# Patient Record
Sex: MALE | Race: WHITE | NOT HISPANIC OR LATINO | Employment: UNEMPLOYED | URBAN - METROPOLITAN AREA
[De-identification: names, ages, dates, MRNs, and addresses within clinical notes are randomized per-mention and may not be internally consistent; named-entity substitution may affect disease eponyms.]

---

## 2017-02-08 DIAGNOSIS — M25.511 ACUTE PAIN OF RIGHT SHOULDER: Primary | ICD-10-CM

## 2017-02-09 ENCOUNTER — OFFICE VISIT (OUTPATIENT)
Dept: ORTHOPEDICS | Facility: CLINIC | Age: 47
End: 2017-02-09
Payer: COMMERCIAL

## 2017-02-09 ENCOUNTER — HOSPITAL ENCOUNTER (OUTPATIENT)
Dept: RADIOLOGY | Facility: HOSPITAL | Age: 47
Discharge: HOME | End: 2017-02-09
Attending: ORTHOPAEDIC SURGERY
Payer: COMMERCIAL

## 2017-02-09 VITALS
HEIGHT: 71 IN | HEART RATE: 74 BPM | WEIGHT: 152.13 LBS | SYSTOLIC BLOOD PRESSURE: 127 MMHG | DIASTOLIC BLOOD PRESSURE: 88 MMHG | BODY MASS INDEX: 21.3 KG/M2

## 2017-02-09 DIAGNOSIS — M25.511 ACUTE PAIN OF RIGHT SHOULDER: ICD-10-CM

## 2017-02-09 DIAGNOSIS — G89.29 CHRONIC RIGHT SHOULDER PAIN: Primary | ICD-10-CM

## 2017-02-09 DIAGNOSIS — M75.111 INCOMPLETE TEAR OF RIGHT ROTATOR CUFF: ICD-10-CM

## 2017-02-09 DIAGNOSIS — M75.110 INCOMPLETE TEAR OF ROTATOR CUFF, UNSPECIFIED LATERALITY: Primary | ICD-10-CM

## 2017-02-09 DIAGNOSIS — M25.511 CHRONIC RIGHT SHOULDER PAIN: Primary | ICD-10-CM

## 2017-02-09 PROCEDURE — 73030 X-RAY EXAM OF SHOULDER: CPT | Mod: 26,RT,, | Performed by: RADIOLOGY

## 2017-02-09 PROCEDURE — 73030 X-RAY EXAM OF SHOULDER: CPT | Mod: TC,PO,RT

## 2017-02-09 PROCEDURE — 99213 OFFICE O/P EST LOW 20 MIN: CPT | Mod: PBBFAC,PO | Performed by: ORTHOPAEDIC SURGERY

## 2017-02-09 PROCEDURE — 99999 PR PBB SHADOW E&M-EST. PATIENT-LVL III: CPT | Mod: PBBFAC,,, | Performed by: ORTHOPAEDIC SURGERY

## 2017-02-09 PROCEDURE — 99204 OFFICE O/P NEW MOD 45 MIN: CPT | Mod: S$PBB,,, | Performed by: ORTHOPAEDIC SURGERY

## 2017-02-09 NOTE — PROGRESS NOTES
"CC:This is a 47-year-old male that complains of right shoulder pain.    HPI:The patient states that he fell at Thanksgiving and sustained an injury to the right shoulder. The pain is rated a 3 out of 10.    PMH:  History reviewed. No pertinent past medical history.    PSH:  History reviewed. No pertinent past surgical history.    Family Hx:  History reviewed. No pertinent family history.    Allergy:  Review of patient's allergies indicates:  No Known Allergies    Medication:  No current outpatient prescriptions on file.    Social History:    Social History     Social History    Marital status: Single     Spouse name: N/A    Number of children: N/A    Years of education: N/A     Occupational History    Not on file.     Social History Main Topics    Smoking status: Current Every Day Smoker     Packs/day: 0.25    Smokeless tobacco: Former User     Types: Chew    Alcohol use Not on file    Drug use: Not on file    Sexual activity: Not on file     Other Topics Concern    Not on file     Social History Narrative    No narrative on file       Vitals:     Visit Vitals    /88    Pulse 74    Ht 5' 11" (1.803 m)    Wt 69 kg (152 lb 1.9 oz)    BMI 21.22 kg/m2        ROS:  GENERAL: No fever, chills, fatigability or weight loss.  SKIN: No rashes, itching or changes in color or texture of skin.  HEAD: No headaches or recent head trauma.  EYES: Visual acuity fine. No photophobia, ocular pain or diplopia.  EARS: Denies ear pain, discharge or vertigo.  NOSE: No loss of smell, no epistaxis or postnasal drip.  MOUTH & THROAT: No hoarseness or change in voice. No excessive gum bleeding.  NODES: Denies swollen glands.  CHEST: Denies MOLINA, cyanosis, wheezing, cough and sputum production.  CARDIOVASCULAR: Denies chest pain, PND, orthopnea or reduced exercise tolerance.  ABDOMEN: Appetite fine. No weight loss. Denies diarrhea, abdominal pain, hematemesis or blood in stool.  URINARY: No flank pain, dysuria or " hematuria.  PERIPHERAL VASCULAR: No claudication or cyanosis.  NEUROLOGIC: No history of seizures, paralysis, alteration of gait or coordination.  MUSCULOSKELETAL: See HPI    PE:  APPEARANCE: Well nourished, well developed, in no acute distress.   HEAD: Normocephalic, atraumatic.  EYES: PERRL. EOMI.   EARS: TM's intact. Light reflex normal. No retraction or perforation.   NOSE: Mucosa pink. Airway clear.  MOUTH & THROAT: No tonsillar enlargement. No pharyngeal erythema or exudate. No stridor.  NECK: Supple.   NODES: No cervical, axillary or inguinal lymph node enlargement.  CHEST: Lungs clear to auscultation.  CARDIOVASCULAR: Normal S1, S2. No rubs, murmurs or gallops.  ABDOMEN: Bowel sounds normal. Not distended. Soft. No tenderness or masses.  NEUROLOGIC: Cranial Nerves: II-XII grossly intact, also see MUSCULOSKELETAL  MUSCULOSKELETAL:     right Shoulder Exam     Muscle Appearance:Normal  Grooming:Normal  Spine Alignment-Normal  Muscle Atrophy-Yes  Deformities-Yes  Tenderness-Yes  Paresthesias-no  Range of Motion-decreased         Abd Pure-decreased         Abd Combined-decreased         Ext-decreased         Flex-decreased         Internal Rot-decreased         External Rot-decreased  Muscle Strength-decreased  Sensation-decreased  Reflexes-Normal  Crepitus-Yes  Impingement-Yes  Apprehension-No  Fatigue-Yes  Scapular Winging-No  Muscle Tightness-Yes  Instability-No  Tests on Exam-no evidence of instability  Neurovascular Status-Normal  Skin-Normal  Positive Drop Arm testYes    Assessment:  The patient has difficulty initiating abduction.  Clinically the patient has a rotator cuff tear that is significant.  As I recommend an arthroscope of the right shoulder.             Diagnosis:              1.right rotator cuff tear               2. Right greater tuberosity fracture    Diagnostic Studies  MRI-Yes  X-Ray-No  EMG/NCV-No  Arthrogram-No  Bone Scan-No  CT Scan-No  Doppler-No  ESR-No  CRP-No  CBC with Diff-No    Rheumatoid/Arthritis Panel-No      Plan:                                                 1. PT-no                                                 2.OT-yes                                          3.NSAID-yes                                        4. Narcotics-yes                                     5. Wound care-N/A                                 6. Rest-yes                                           7. Surgery-yes                                         8. ERICH Hose-no                                    9. Anticoagulation therapy-no               10. Elevation-no                                     11. Crutches-no                                    12. Walker-no             13. Cane no                        14. Referral-no                                     15.Injection-no                            16. Splint   /    Cast   /   Cast Shoe-No              17. RICE-none            18. Follow up- one week

## 2017-02-09 NOTE — LETTER
February 10, 2017      Dandre Christensen MD  [address] [facility] [address] [address]           [facility]  [address]  [place] [address]  Phone: [phone]  Fax: [phone]          Patient: García Delaney   MR Number: [id number]   YOB: 1970   Date of Visit: 2/9/2017       Dear Dr. Dandre Christensen:    Thank you for referring García Delaney to me for evaluation. Attached you will find relevant portions of my assessment and plan of care.    If you have questions, please do not hesitate to call me. I look forward to following García Delaney along with you.    Sincerely,    Jacinto Graves Sr., MD    Enclosure  CC:  No Recipients    If you would like to receive this communication electronically, please contact [email or web address] or [phone] to request more information on [employer] Link access.    For providers and/or their staff who would like to refer a patient to [facility], please contact us through our one-stop-shop provider referral line, [facility], at [phone].    If you feel you have received this communication in error or would no longer like to receive these types of communications, please e-mail [email or web address]

## 2017-02-10 PROBLEM — M75.111 INCOMPLETE TEAR OF RIGHT ROTATOR CUFF: Status: ACTIVE | Noted: 2017-02-10

## 2017-02-10 PROBLEM — M75.110 INCOMPLETE TEAR OF ROTATOR CUFF: Status: ACTIVE | Noted: 2017-02-10

## 2017-02-10 NOTE — PATIENT INSTRUCTIONS
Rotator Cuff Tear  The rotator cuff is a group of muscles and tendons that surround the shoulder joint. These muscles and tendons hold the arm in its joint. They also help the shoulder to rotate. The rotator cuff can be torn from overuse or injury. Gradual wear and tear can lead to inflammation of these tendons. This can progress to gradual or sudden tears.  Symptoms of a torn rotator cuff include:  · Shoulder pain that gets worse when you raise your arm overhead  · Weakness of the shoulder muscles with overhead activity  · Popping and clicking when you move your shoulder  · Shoulder pain that wakes you up at night when sleeping on the hurt shoulder  Diagnosis is made by an MRI or arthroscopy. This is a surgical procedure to look inside the joint through a small tube. Partial rotator cuff tears can be treated by first resting, then strengthening the rotator cuff muscles.  Anti-inflammatory medicines, such as ibuprofen or naproxen, are useful. A limited number of steroid injections can be given. Surgery may be recommended for complete tears and partial tears that do not respond to medical treatment.  Home care  · Avoid activities that make your pain worse. This includes overhead activities, doing the same motion over and over, and heavy lifting.  · You may use over-the-counter pain medicines to control pain, unless another medicine was prescribed. If you have chronic liver or kidney disease or ever had a stomach ulcer or GI bleeding, talk with your healthcare provider before using these medicines.  · If you were given a sling, use it for comfort. After your pain decreases, dont keep your arm in the sling all the time. Take your arm out several times a day and move the shoulder joint, as you are able.  · Your healthcare provider may recommend gentle pendulum exercises. Stand or sit with your arm vertical and close to your side. Relax your shoulder muscles and gently swing the arm forward and back, side to side, and  in small circles for about 5 minutes. Do this once or twice a day. There should be only slight pain with this exercise.  · You may benefit from physical therapy or a home exercise program to strengthen your shoulder muscles. This will also increase your pain-free range of motion. Applying heat prior to exercises can help prepare the muscles and joint for activity. Talk to your healthcare provider about what is best for your condition.  Follow-up care  Follow up with your healthcare provider, or as advised.  When to seek medical advice  Call your healthcare provider right away if any of the following occur:  · Increasing shoulder pain  · Rapid swelling in the involved shoulder or arm  · Numbness, tingling, or pain radiating down the arm to the hand  · Loss of strength in the affected arm  Date Last Reviewed: 11/23/2015  © 2034-8174 [employer]. [address] [place] [address], [place] [address]. All rights reserved. This information is not intended as a substitute for professional medical care. Always follow your healthcare professional's instructions.

## 2017-02-13 DIAGNOSIS — Z01.818 PRE-OP TESTING: Primary | ICD-10-CM

## 2017-02-13 DIAGNOSIS — M75.101 TEAR OF RIGHT ROTATOR CUFF, UNSPECIFIED TEAR EXTENT: ICD-10-CM

## 2017-02-24 ENCOUNTER — HOSPITAL ENCOUNTER (OUTPATIENT)
Dept: RADIOLOGY | Facility: HOSPITAL | Age: 47
Discharge: HOME | End: 2017-02-24
Attending: ORTHOPAEDIC SURGERY
Payer: COMMERCIAL

## 2017-02-24 DIAGNOSIS — G89.29 CHRONIC RIGHT SHOULDER PAIN: ICD-10-CM

## 2017-02-24 DIAGNOSIS — M25.511 CHRONIC RIGHT SHOULDER PAIN: ICD-10-CM

## 2017-02-24 PROCEDURE — 73221 MRI JOINT UPR EXTREM W/O DYE: CPT | Mod: 26,RT,, | Performed by: RADIOLOGY

## 2017-02-24 PROCEDURE — 73221 MRI JOINT UPR EXTREM W/O DYE: CPT | Mod: TC,PO,RT

## 2017-04-10 ENCOUNTER — TELEPHONE (OUTPATIENT)
Dept: ORTHOPEDICS | Facility: CLINIC | Age: 47
End: 2017-04-10

## 2017-04-10 NOTE — TELEPHONE ENCOUNTER
Returned pt phone call. Pt states he scheduled for surgery but wants to know the results of his MRI. informed pt i would send him a message for Dr. Graves or Jos Caro PA-C to call and discuss that with him. Pt also asked if he could get a print out of [facility] MRI. Informed pt he could come pick it up today. Pt verified understanding.

## 2017-04-10 NOTE — TELEPHONE ENCOUNTER
----- Message from Tito Chase sent at 4/10/2017 11:38 AM CDT -----  Contact: pt  Pt is calling nurse staff regarding results of patient MRI  Since February. Pt call back to be advised [phone] thanks

## 2017-04-13 ENCOUNTER — TELEPHONE (OUTPATIENT)
Dept: ORTHOPEDICS | Facility: CLINIC | Age: 47
End: 2017-04-13

## 2017-04-13 NOTE — TELEPHONE ENCOUNTER
----- Message from Tito Chase sent at 4/13/2017  9:13 AM CDT -----  Contact: pt  Pt is calling nurse staff regarding when is pt scheduled date for surgery is; or if, and when pt need surgery. Pt ask staff to call back at 10:00 if at all possible to be advised today  [phone] thanks

## 2017-04-19 ENCOUNTER — TELEPHONE (OUTPATIENT)
Dept: ORTHOPEDICS | Facility: CLINIC | Age: 47
End: 2017-04-19

## 2017-04-19 NOTE — TELEPHONE ENCOUNTER
Returned pt phone call. Pt wanted to know the dates and times of his pre op appointments. Pt was given dates and verified understanding.

## 2017-04-28 ENCOUNTER — HOSPITAL ENCOUNTER (OUTPATIENT)
Dept: RADIOLOGY | Facility: HOSPITAL | Age: 47
Discharge: HOME | End: 2017-04-28
Attending: ORTHOPAEDIC SURGERY
Payer: COMMERCIAL

## 2017-04-28 ENCOUNTER — CLINICAL SUPPORT (OUTPATIENT)
Dept: CARDIOLOGY | Facility: CLINIC | Age: 47
End: 2017-04-28
Payer: COMMERCIAL

## 2017-04-28 DIAGNOSIS — Z01.818 PRE-OP TESTING: ICD-10-CM

## 2017-04-28 PROCEDURE — 71020 XR CHEST PA AND LATERAL PRE-OP: CPT | Mod: 26,,, | Performed by: RADIOLOGY

## 2017-04-28 PROCEDURE — 71020 XR CHEST PA AND LATERAL PRE-OP: CPT | Mod: TC,PO

## 2017-04-28 PROCEDURE — 93005 ELECTROCARDIOGRAM TRACING: CPT | Mod: PBBFAC,PO | Performed by: INTERNAL MEDICINE

## 2017-04-28 PROCEDURE — 93010 ELECTROCARDIOGRAM REPORT: CPT | Mod: S$PBB,,, | Performed by: INTERNAL MEDICINE

## 2017-05-03 ENCOUNTER — OFFICE VISIT (OUTPATIENT)
Dept: INTERNAL MEDICINE | Facility: CLINIC | Age: 47
End: 2017-05-03
Payer: COMMERCIAL

## 2017-05-03 VITALS
SYSTOLIC BLOOD PRESSURE: 132 MMHG | BODY MASS INDEX: 21.91 KG/M2 | HEART RATE: 97 BPM | WEIGHT: 156.5 LBS | DIASTOLIC BLOOD PRESSURE: 76 MMHG | TEMPERATURE: 97 F | HEIGHT: 71 IN

## 2017-05-03 DIAGNOSIS — Z01.818 PREOP EXAMINATION: Primary | ICD-10-CM

## 2017-05-03 DIAGNOSIS — M75.111 INCOMPLETE TEAR OF RIGHT ROTATOR CUFF: ICD-10-CM

## 2017-05-03 PROCEDURE — 99999 PR PBB SHADOW E&M-EST. PATIENT-LVL III: CPT | Mod: PBBFAC,,, | Performed by: FAMILY MEDICINE

## 2017-05-03 PROCEDURE — 99213 OFFICE O/P EST LOW 20 MIN: CPT | Mod: PBBFAC,PO | Performed by: FAMILY MEDICINE

## 2017-05-03 PROCEDURE — 99203 OFFICE O/P NEW LOW 30 MIN: CPT | Mod: S$PBB,,, | Performed by: FAMILY MEDICINE

## 2017-05-03 NOTE — MR AVS SNAPSHOT
[facility]  [address] [address]  Phone: [phone]  Fax: [phone]                  García Delaney   5/3/2017 2:40 PM   Office Visit    Description:  Male : 1970   Provider:  Marin Lopez MD   Department:  [facility]           Reason for Visit     Pre-op Exam           Diagnoses this Visit        Comments    Preop examination    -  Primary Pt has no risk factors. Low cardiovascular risk and cleared for surgery    Incomplete tear of right rotator cuff     Pt is cleared for surgery           To Do List           Future Appointments        Provider Department Dept Phone    2017 2:00 PM Jos Caro PA-C [facility] [phone]      Your Future Surgeries/Procedures     May 12, 2017   Surgery with Jacinto Graves Sr., MD   [facility]  ([facility])    [id number] [place] [address] [phone]              Goals (5 Years of Data)     None      [facility]     [facility] Nurse Care Line -  Assistance  Unless otherwise directed by your provider, please contact [facility], our nurse care line that is available for  assistance.     Registered nurses in the [facility] provide: appointment scheduling, clinical advisement, health education, and other advisory services.  Call: [phone] (toll free)               Medications           Message regarding Medications     Verify the changes and/or additions to your medication regime listed below are the same as discussed with your clinician today.  If any of these changes or additions are incorrect, please notify your healthcare provider.             Verify that the below list of medications is an accurate representation of the medications you are currently taking.  If none reported, the list may be blank. If incorrect, please contact your healthcare provider. Carry this list with you in case of emergency.  "               Clinical Reference Information           Your Vitals Were     BP Pulse Temp Height Weight BMI    132/76 (BP Location: Right arm, Patient Position: Sitting, BP Method: Manual) 97 97 °F (36.1 °C) (Tympanic) 5' 11" (1.803 m) 71 kg (156 lb 8.4 oz) 21.83 kg/m2      Blood Pressure          Most Recent Value    BP  132/76      Allergies as of 5/3/2017     No Known Allergies      Immunizations Administered on Date of Encounter - 5/3/2017     None      [facility] Sign-Up     Activating your [facility] account is as easy as 1-2-3!     1) Visit [email or web address], select Sign Up Now, enter this activation code and your date of birth, then select Next.  [email or web address]  Expires: 6/17/2017  2:56 PM      2) Create a username and password to use when you visit [facility] in the future and select a security question in case you lose your password and select Next.    3) Enter your e-mail address and click Sign Up!    Additional Information  If you have questions, please e-mail [email or web address] or call [phone] to talk to our [facility] staff. Remember, [facility] is NOT to be used for urgent needs. For medical emergencies, dial 911.         Smoking Cessation     If you would like to quit smoking:   You may be eligible for free services if you are a [place] resident and started smoking cigarettes before September 1, 1988.  Call the Smoking Cessation Trust ([facility]) toll free at (653) 268-1570 or (503) 299-6684.   Call 1-800-QUIT-NOW if you do not meet the above criteria.   Contact us via email: [email or web address]   View our website for more information: [email or web address]        Language Assistance Services     ATTENTION: Language assistance services are available, free of charge. Please call [phone].      ATENCIÓN: Si habla español, tiene a villeda disposición servicios gratuitos de asistencia lingüística. Llame al [phone].     CHÚ Ý: N?u b?n nói Ti?ng Vi?t, có các d?ch v? h? tr? ngôn ng? " mi?n phí dành cho b?n. G?i s? [phone].         [facility] complies with applicable Federal civil rights laws and does not discriminate on the basis of race, color, national origin, age, disability, or sex.

## 2017-05-03 NOTE — LETTER
May 3, 2017      Jacinto Graves Sr., MD  [address] [place] [address]           [facility]  [address]  [place] [address]  Phone: [phone]  Fax: [phone]          Patient: García Delaney   MR Number: [id number]   YOB: 1970   Date of Visit: 5/3/2017       Dear Dr. Jacinto Graves Sr.:    Thank you for referring García Delaney to me for evaluation. Attached you will find relevant portions of my assessment and plan of care.    If you have questions, please do not hesitate to call me. I look forward to following García Delaney along with you.    Sincerely,    Marin Lopez MD    Enclosure  CC:  No Recipients    If you would like to receive this communication electronically, please contact [email or web address] or [phone] to request more information on [employer] Link access.    For providers and/or their staff who would like to refer a patient to [facility], please contact us through our one-stop-shop provider referral line, [facility], at [phone].    If you feel you have received this communication in error or would no longer like to receive these types of communications, please e-mail [email or web address]

## 2017-05-03 NOTE — PROGRESS NOTES
Subjective:       Patient ID: García Delaney is a 47 y.o. male.    Chief Complaint: Pre-op Exam    HPI Comments: Pre-op Exam:       Pt is a healthy 47 year old who is here for preop exam. Pt will have right rotator cuff repair. Pt has CAD. Pt has had general anesthesia before and did well.    Review of Systems   Constitutional: Negative.    HENT: Negative.    Respiratory: Negative.    Cardiovascular: Negative.    Gastrointestinal: Negative.    Skin: Negative.    Neurological: Negative.    Psychiatric/Behavioral: Negative.        Objective:      Physical Exam   Constitutional: He is oriented to person, place, and time. He appears well-developed and well-nourished.   HENT:   Head: Normocephalic.   Eyes: EOM are normal. Pupils are equal, round, and reactive to light.   Neck: Normal range of motion. Neck supple. No JVD present. No thyromegaly present.   Cardiovascular: Normal rate and regular rhythm.    Pulmonary/Chest: Effort normal and breath sounds normal.   Abdominal: Soft. Bowel sounds are normal.   Musculoskeletal: Normal range of motion.   Lymphadenopathy:     He has no cervical adenopathy.   Neurological: He is alert and oriented to person, place, and time. He has normal reflexes.   Skin: Skin is warm and dry.   Psychiatric: He has a normal mood and affect. His behavior is normal.       Assessment:       1. Preop examination    2. Incomplete tear of right rotator cuff        Plan:       Preop examination  Comments:  Pt has no risk factors. Low cardiovascular risk and cleared for surgery    Incomplete tear of right rotator cuff  Comments:  Pt is cleared for surgery

## 2017-05-10 NOTE — PRE ADMISSION SCREENING
Pre op instructions reviewed with patient per phone:    To confirm, Your surgeon has instructed you:  Surgery is scheduled 05/12/17 at 0700.      Please report to [facility] 1st floor main lobby by 0530  Pre admit office to call afternoon prior to surgery with final arrival time.      INSTRUCTIONS IMPORTANT!!!  ¨ Do not eat, drink, or smoke after 12 midnight-including water. OK to brush teeth, no gum, candy or mints!    ¨ Take only these medicines with a small swallow of water-morning of surgery.  N/A        ____  Do not wear makeup, including mascara.  ____  No powder, lotions or creams to surgical area.  ____  Please remove all jewelry, including piercings and leave at home.  ____  No money or valuables needed. Please leave at home.  ____  Please bring identification and insurance information to hospital.  ____  If going home the same day, arrange for a ride home. You will not be able to   drive if Anesthesia was used.  ____  Children, under 12 years old, must remain in the waiting room with an adult.  They are not allowed in patient areas.  ____  Wear loose fitting clothing. Allow for dressings, bandages.  ____  Stop Aspirin, Ibuprofen, Motrin and Aleve at least 5-7 days before surgery, unless otherwise instructed by your doctor, or the nurse.   You MAY use Tylenol/acetaminophen until day of surgery.  ____  If you take diabetic medication, do not take am of surgery unless instructed by   Doctor.  ____ Stop taking any Fish Oil supplement or any Vitamins that contain Vitamin E at least 5 days prior to surgery.          Bathing Instructions-- The night before surgery and the morning prior to coming to the hospital:   -Do not shave the surgical area.   -Shower and wash your hair and body as usual with anti-bacterial  soap and shampoo.   -Rinse your hair and body completely.   -Use one packet of hibiclens to wash the surgical site (using your hand) gently for 5 minutes.  Do not scrub you skin  too hard.   -Do not use hibiclens on your head, face, or genitals.   -Do not wash with anti-bacterial soap after you use the hibiclens.   -Rinse your body thoroughly.   -Dry with clean, soft towel.  Do not use lotion, cream, deodorant, or powders on   the surgical site.    Use antibacterial soap in place of hibiclens if your surgery is on the head, face or genitals.         Surgical Site Infection    Prevention of surgical site infections:     -Keep incisions clean and dry.   -Do not soak/submerge incisions in water until completely healed.   -Do not apply lotions, powders, creams, or deodorants to site.   -Always make sure hands are cleaned with antibacterial soap/ alcohol-based   prior to touching the surgical site.  (This includes doctors, nurses, staff, and yourself.)    Signs and symptoms:   -Redness and pain around the area where you had surgery   -Drainage of cloudy fluid from your surgical wound   -Fever over 100.4  I have read or had read and explained to me, and understand the above information.

## 2017-05-11 ENCOUNTER — ANESTHESIA EVENT (OUTPATIENT)
Dept: SURGERY | Facility: HOSPITAL | Age: 47
End: 2017-05-11
Payer: COMMERCIAL

## 2017-05-12 ENCOUNTER — HOSPITAL ENCOUNTER (OUTPATIENT)
Facility: HOSPITAL | Age: 47
Discharge: HOME | End: 2017-05-12
Attending: ORTHOPAEDIC SURGERY | Admitting: ORTHOPAEDIC SURGERY
Payer: COMMERCIAL

## 2017-05-12 ENCOUNTER — ANESTHESIA (OUTPATIENT)
Dept: SURGERY | Facility: HOSPITAL | Age: 47
End: 2017-05-12
Payer: COMMERCIAL

## 2017-05-12 VITALS
SYSTOLIC BLOOD PRESSURE: 117 MMHG | BODY MASS INDEX: 20.9 KG/M2 | DIASTOLIC BLOOD PRESSURE: 74 MMHG | HEIGHT: 71 IN | HEART RATE: 75 BPM | WEIGHT: 149.25 LBS | OXYGEN SATURATION: 96 % | RESPIRATION RATE: 12 BRPM | TEMPERATURE: 98 F

## 2017-05-12 DIAGNOSIS — M25.511 RIGHT SHOULDER PAIN: ICD-10-CM

## 2017-05-12 DIAGNOSIS — Z01.818 PREOP EXAMINATION: ICD-10-CM

## 2017-05-12 DIAGNOSIS — M75.111 INCOMPLETE TEAR OF RIGHT ROTATOR CUFF: Primary | ICD-10-CM

## 2017-05-12 PROCEDURE — 71000015 HC POSTOP RECOV 1ST HR: Performed by: ORTHOPAEDIC SURGERY

## 2017-05-12 PROCEDURE — 37000009 HC ANESTHESIA EA ADD 15 MINS: Performed by: ORTHOPAEDIC SURGERY

## 2017-05-12 PROCEDURE — C1713 ANCHOR/SCREW BN/BN,TIS/BN: HCPCS | Performed by: ORTHOPAEDIC SURGERY

## 2017-05-12 PROCEDURE — 29807 SHO ARTHRS SRG RPR SLAP LES: CPT | Mod: 51,RT,, | Performed by: ORTHOPAEDIC SURGERY

## 2017-05-12 PROCEDURE — 25000003 PHARM REV CODE 250: Performed by: NURSE ANESTHETIST, CERTIFIED REGISTERED

## 2017-05-12 PROCEDURE — 29826 SHO ARTHRS SRG DECOMPRESSION: CPT | Mod: RT,,, | Performed by: ORTHOPAEDIC SURGERY

## 2017-05-12 PROCEDURE — 37000008 HC ANESTHESIA 1ST 15 MINUTES: Performed by: ORTHOPAEDIC SURGERY

## 2017-05-12 PROCEDURE — 27201423 OPTIME MED/SURG SUP & DEVICES STERILE SUPPLY: Performed by: ORTHOPAEDIC SURGERY

## 2017-05-12 PROCEDURE — 63600175 PHARM REV CODE 636 W HCPCS: Performed by: NURSE ANESTHETIST, CERTIFIED REGISTERED

## 2017-05-12 PROCEDURE — 63600175 PHARM REV CODE 636 W HCPCS: Performed by: ORTHOPAEDIC SURGERY

## 2017-05-12 PROCEDURE — 71000039 HC RECOVERY, EACH ADD'L HOUR: Performed by: ORTHOPAEDIC SURGERY

## 2017-05-12 PROCEDURE — 36000710: Performed by: ORTHOPAEDIC SURGERY

## 2017-05-12 PROCEDURE — 63600175 PHARM REV CODE 636 W HCPCS: Performed by: ANESTHESIOLOGY

## 2017-05-12 PROCEDURE — 25000003 PHARM REV CODE 250: Performed by: ORTHOPAEDIC SURGERY

## 2017-05-12 PROCEDURE — 71000033 HC RECOVERY, INTIAL HOUR: Performed by: ORTHOPAEDIC SURGERY

## 2017-05-12 PROCEDURE — 36000711: Performed by: ORTHOPAEDIC SURGERY

## 2017-05-12 PROCEDURE — 29806 SHO ARTHRS SRG CAPSULORRAPHY: CPT | Mod: RT,,, | Performed by: ORTHOPAEDIC SURGERY

## 2017-05-12 DEVICE — ANCHOR GRYPHON W/ORTHOCORD: Type: IMPLANTABLE DEVICE | Site: SHOULDER | Status: FUNCTIONAL

## 2017-05-12 RX ORDER — MUPIROCIN 20 MG/G
1 OINTMENT TOPICAL
Status: DISCONTINUED | OUTPATIENT
Start: 2017-05-12 | End: 2017-05-12 | Stop reason: HOSPADM

## 2017-05-12 RX ORDER — LIDOCAINE HYDROCHLORIDE 10 MG/ML
INJECTION, SOLUTION EPIDURAL; INFILTRATION; INTRACAUDAL; PERINEURAL
Status: DISCONTINUED | OUTPATIENT
Start: 2017-05-12 | End: 2017-05-12 | Stop reason: HOSPADM

## 2017-05-12 RX ORDER — SODIUM CHLORIDE, SODIUM LACTATE, POTASSIUM CHLORIDE, CALCIUM CHLORIDE 600; 310; 30; 20 MG/100ML; MG/100ML; MG/100ML; MG/100ML
INJECTION, SOLUTION INTRAVENOUS CONTINUOUS PRN
Status: DISCONTINUED | OUTPATIENT
Start: 2017-05-12 | End: 2017-05-12

## 2017-05-12 RX ORDER — SUCCINYLCHOLINE CHLORIDE 20 MG/ML
INJECTION INTRAMUSCULAR; INTRAVENOUS
Status: DISCONTINUED | OUTPATIENT
Start: 2017-05-12 | End: 2017-05-12

## 2017-05-12 RX ORDER — LIDOCAINE HYDROCHLORIDE 10 MG/ML
INJECTION INFILTRATION; PERINEURAL
Status: DISCONTINUED | OUTPATIENT
Start: 2017-05-12 | End: 2017-05-12

## 2017-05-12 RX ORDER — MORPHINE SULFATE 10 MG/ML
2 INJECTION INTRAMUSCULAR; INTRAVENOUS; SUBCUTANEOUS EVERY 5 MIN PRN
Status: DISCONTINUED | OUTPATIENT
Start: 2017-05-12 | End: 2017-05-12 | Stop reason: HOSPADM

## 2017-05-12 RX ORDER — SODIUM CHLORIDE 9 MG/ML
3 INJECTION, SOLUTION INTRAMUSCULAR; INTRAVENOUS; SUBCUTANEOUS
Status: DISCONTINUED | OUTPATIENT
Start: 2017-05-12 | End: 2017-05-12 | Stop reason: HOSPADM

## 2017-05-12 RX ORDER — MIDAZOLAM HYDROCHLORIDE 1 MG/ML
INJECTION, SOLUTION INTRAMUSCULAR; INTRAVENOUS
Status: DISCONTINUED | OUTPATIENT
Start: 2017-05-12 | End: 2017-05-12

## 2017-05-12 RX ORDER — OXYCODONE HYDROCHLORIDE 5 MG/1
5 TABLET ORAL
Status: DISCONTINUED | OUTPATIENT
Start: 2017-05-12 | End: 2017-05-12 | Stop reason: HOSPADM

## 2017-05-12 RX ORDER — FENTANYL CITRATE 50 UG/ML
INJECTION, SOLUTION INTRAMUSCULAR; INTRAVENOUS
Status: DISCONTINUED | OUTPATIENT
Start: 2017-05-12 | End: 2017-05-12

## 2017-05-12 RX ORDER — SODIUM CHLORIDE, SODIUM LACTATE, POTASSIUM CHLORIDE, CALCIUM CHLORIDE 600; 310; 30; 20 MG/100ML; MG/100ML; MG/100ML; MG/100ML
INJECTION, SOLUTION INTRAVENOUS CONTINUOUS
Status: DISCONTINUED | OUTPATIENT
Start: 2017-05-12 | End: 2017-05-12 | Stop reason: HOSPADM

## 2017-05-12 RX ORDER — EPINEPHRINE 1 MG/ML
INJECTION, SOLUTION INTRACARDIAC; INTRAMUSCULAR; INTRAVENOUS; SUBCUTANEOUS
Status: DISCONTINUED | OUTPATIENT
Start: 2017-05-12 | End: 2017-05-12 | Stop reason: HOSPADM

## 2017-05-12 RX ORDER — HYDROCODONE BITARTRATE AND ACETAMINOPHEN 5; 325 MG/1; MG/1
1 TABLET ORAL EVERY 4 HOURS PRN
Status: DISCONTINUED | OUTPATIENT
Start: 2017-05-12 | End: 2017-05-12 | Stop reason: HOSPADM

## 2017-05-12 RX ORDER — CEFAZOLIN SODIUM 1 G/3ML
INJECTION, POWDER, FOR SOLUTION INTRAMUSCULAR; INTRAVENOUS
Status: DISCONTINUED | OUTPATIENT
Start: 2017-05-12 | End: 2017-05-12

## 2017-05-12 RX ORDER — DEXAMETHASONE SODIUM PHOSPHATE 4 MG/ML
INJECTION, SOLUTION INTRA-ARTICULAR; INTRALESIONAL; INTRAMUSCULAR; INTRAVENOUS; SOFT TISSUE
Status: DISCONTINUED | OUTPATIENT
Start: 2017-05-12 | End: 2017-05-12

## 2017-05-12 RX ORDER — ONDANSETRON 2 MG/ML
4 INJECTION INTRAMUSCULAR; INTRAVENOUS DAILY PRN
Status: DISCONTINUED | OUTPATIENT
Start: 2017-05-12 | End: 2017-05-12 | Stop reason: HOSPADM

## 2017-05-12 RX ORDER — ONDANSETRON 2 MG/ML
INJECTION INTRAMUSCULAR; INTRAVENOUS
Status: DISCONTINUED | OUTPATIENT
Start: 2017-05-12 | End: 2017-05-12

## 2017-05-12 RX ORDER — CEFAZOLIN SODIUM 1 G/50ML
2 SOLUTION INTRAVENOUS
Status: DISCONTINUED | OUTPATIENT
Start: 2017-05-12 | End: 2017-05-12 | Stop reason: HOSPADM

## 2017-05-12 RX ORDER — PROPOFOL 10 MG/ML
VIAL (ML) INTRAVENOUS
Status: DISCONTINUED | OUTPATIENT
Start: 2017-05-12 | End: 2017-05-12

## 2017-05-12 RX ORDER — ROCURONIUM BROMIDE 10 MG/ML
INJECTION, SOLUTION INTRAVENOUS
Status: DISCONTINUED | OUTPATIENT
Start: 2017-05-12 | End: 2017-05-12

## 2017-05-12 RX ORDER — HYDROMORPHONE HYDROCHLORIDE 2 MG/ML
0.2 INJECTION, SOLUTION INTRAMUSCULAR; INTRAVENOUS; SUBCUTANEOUS EVERY 5 MIN PRN
Status: DISCONTINUED | OUTPATIENT
Start: 2017-05-12 | End: 2017-05-12 | Stop reason: HOSPADM

## 2017-05-12 RX ORDER — OXYCODONE AND ACETAMINOPHEN 10; 325 MG/1; MG/1
1 TABLET ORAL EVERY 4 HOURS PRN
Qty: 90 TABLET | Refills: 0 | Status: SHIPPED | OUTPATIENT
Start: 2017-05-12

## 2017-05-12 RX ORDER — BUPIVACAINE HYDROCHLORIDE 2.5 MG/ML
INJECTION, SOLUTION EPIDURAL; INFILTRATION; INTRACAUDAL
Status: DISCONTINUED | OUTPATIENT
Start: 2017-05-12 | End: 2017-05-12 | Stop reason: HOSPADM

## 2017-05-12 RX ADMIN — FENTANYL CITRATE 50 MCG: 50 INJECTION, SOLUTION INTRAMUSCULAR; INTRAVENOUS at 07:05

## 2017-05-12 RX ADMIN — FENTANYL CITRATE 100 MCG: 50 INJECTION, SOLUTION INTRAMUSCULAR; INTRAVENOUS at 07:05

## 2017-05-12 RX ADMIN — PROPOFOL 30 MG: 10 INJECTION, EMULSION INTRAVENOUS at 09:05

## 2017-05-12 RX ADMIN — PROPOFOL 70 MG: 10 INJECTION, EMULSION INTRAVENOUS at 09:05

## 2017-05-12 RX ADMIN — Medication 10 MG: at 09:05

## 2017-05-12 RX ADMIN — DEXAMETHASONE SODIUM PHOSPHATE 4 MG: 4 INJECTION, SOLUTION INTRA-ARTICULAR; INTRALESIONAL; INTRAMUSCULAR; INTRAVENOUS; SOFT TISSUE at 10:05

## 2017-05-12 RX ADMIN — ONDANSETRON 4 MG: 2 INJECTION, SOLUTION INTRAMUSCULAR; INTRAVENOUS at 10:05

## 2017-05-12 RX ADMIN — PROPOFOL 130 MG: 10 INJECTION, EMULSION INTRAVENOUS at 07:05

## 2017-05-12 RX ADMIN — FENTANYL CITRATE 50 MCG: 50 INJECTION, SOLUTION INTRAMUSCULAR; INTRAVENOUS at 08:05

## 2017-05-12 RX ADMIN — MORPHINE SULFATE 2 MG: 10 INJECTION, SOLUTION INTRAMUSCULAR; INTRAVENOUS at 11:05

## 2017-05-12 RX ADMIN — PROPOFOL 50 MG: 10 INJECTION, EMULSION INTRAVENOUS at 08:05

## 2017-05-12 RX ADMIN — Medication 20 MG: at 09:05

## 2017-05-12 RX ADMIN — FENTANYL CITRATE 50 MCG: 50 INJECTION, SOLUTION INTRAMUSCULAR; INTRAVENOUS at 10:05

## 2017-05-12 RX ADMIN — ROCURONIUM BROMIDE 25 MG: 10 INJECTION, SOLUTION INTRAVENOUS at 07:05

## 2017-05-12 RX ADMIN — PROPOFOL 50 MG: 10 INJECTION, EMULSION INTRAVENOUS at 10:05

## 2017-05-12 RX ADMIN — MIDAZOLAM HYDROCHLORIDE 2 MG: 1 INJECTION, SOLUTION INTRAMUSCULAR; INTRAVENOUS at 07:05

## 2017-05-12 RX ADMIN — SODIUM CHLORIDE, SODIUM LACTATE, POTASSIUM CHLORIDE, AND CALCIUM CHLORIDE: 600; 310; 30; 20 INJECTION, SOLUTION INTRAVENOUS at 07:05

## 2017-05-12 RX ADMIN — SODIUM CHLORIDE, SODIUM LACTATE, POTASSIUM CHLORIDE, AND CALCIUM CHLORIDE: 600; 310; 30; 20 INJECTION, SOLUTION INTRAVENOUS at 08:05

## 2017-05-12 RX ADMIN — PROPOFOL 50 MG: 10 INJECTION, EMULSION INTRAVENOUS at 09:05

## 2017-05-12 RX ADMIN — PROPOFOL 70 MG: 10 INJECTION, EMULSION INTRAVENOUS at 07:05

## 2017-05-12 RX ADMIN — LIDOCAINE HYDROCHLORIDE 40 MG: 10 INJECTION, SOLUTION INFILTRATION; PERINEURAL at 07:05

## 2017-05-12 RX ADMIN — Medication 20 MG: at 10:05

## 2017-05-12 RX ADMIN — SUCCINYLCHOLINE CHLORIDE 100 MG: 20 INJECTION, SOLUTION INTRAMUSCULAR; INTRAVENOUS at 07:05

## 2017-05-12 RX ADMIN — CEFAZOLIN 2 G: 1 INJECTION, POWDER, FOR SOLUTION INTRAMUSCULAR; INTRAVENOUS at 07:05

## 2017-05-12 RX ADMIN — ROCURONIUM BROMIDE 5 MG: 10 INJECTION, SOLUTION INTRAVENOUS at 07:05

## 2017-05-12 NOTE — IP AVS SNAPSHOT
[address] [place] [address]           Patient Discharge Instructions   Our goal is to set you up for success. This packet includes information on your condition, medications, and your home care.  It will help you care for yourself to prevent having to return to the hospital.     Please ask your nurse if you have any questions.      There are many details to remember when preparing to leave the hospital. Here is what you will need to do:    1. Take your medicine. If you are prescribed medications, review your Medication List on the following pages. You may have new medications to  at the pharmacy and others that you'll need to stop taking. Review the instructions for how and when to take your medications. Talk with your doctor or nurses if you are unsure of what to do.     2. Go to your follow-up appointments. Specific follow-up information is listed in the following pages. Your may be contacted by a nurse or clinical provider about future appointments. Be sure we have all of the phone numbers to reach you. Please contact your provider's office if you are unable to make an appointment.     3. Watch for warning signs. Your doctor or nurse will give you detailed warning signs to watch for and when to call for assistance. These instructions may also include educational information about your condition. If you experience any of warning signs to your health, call your doctor.               ** Verify the list of medication(s) below is accurate and up to date. Carry this with you in case of emergency. If your medications have changed, please notify your healthcare provider.             Medication List      ASK your doctor about these medications        Additional Info                      oxycodone-acetaminophen  mg per tablet   Commonly known as:  PERCOCET   Quantity:  90 tablet   Refills:  0   Dose:  1 tablet    Instructions:  Take 1 tablet by mouth every 4  (four) hours as needed.     Begin Date    AM    Noon    PM    Bedtime            Where to Get Your Medications      You can get these medications from any pharmacy     Bring a paper prescription for each of these medications     oxycodone-acetaminophen  mg per tablet                  Please bring to all follow up appointments:    1. A copy of your discharge instructions.  2. All medicines you are currently taking in their original bottles.  3. Identification and insurance card.    Please arrive 15 minutes ahead of scheduled appointment time.    Please call 24 hours in advance if you must reschedule your appointment and/or time.        Your Scheduled Appointments     May 25, 2017  2:00 PM CDT   Post OP with [facility] ([facility])    [address] [facility] [address] [place] [address] [phone]              Follow-up Information     Follow up with Jacinto Graves Sr, MD.    Specialty:  Orthopedic Surgery    Why:  As needed, If symptoms worsen, For suture removal, For wound re-check    Contact information:    [address] [place] [address]  [phone]        Referrals     Future Orders    Referral to Occupational therapy     Questions:    Post Surgical?:  Yes    OT Location:  Shoulder    Restore Functional ADL Training?:      Therapeutic Exercises:  Passive    Active    Resistive    Electric Stimulation:  Yes    IONTO.:  No    U/S:  Yes    Developmental Stimulation?:      Splinting (Specific Area):      Specialty Program:          Discharge Instructions     Future Orders    Call MD for:  difficulty breathing, headache or visual disturbances     Call MD for:  extreme fatigue     Call MD for:  hives     Call MD for:  persistent dizziness or light-headedness     Call MD for:  persistent nausea and vomiting     Call MD for:  redness, tenderness, or signs of infection (pain, swelling, redness, odor or green/yellow discharge around incision site)     Call MD for:  severe uncontrolled  pain     Call MD for:  temperature >100.4     Diet general     Questions:    Total calories:      Fat restriction, if any:      Protein restriction, if any:      Na restriction, if any:      Fluid restriction:      Additional restrictions:      Other restrictions (specify):     Scheduling Instructions:    Keep the shoulder immobilizer in place    Remove dressing in 48 hours     Scheduling Instructions:    Apply betadine, gauze and sponge tape, daily, after 48 hours        Discharge Instructions         Discharge Instructions for Shoulder Arthroscopy  You had a shoulder arthroscopy. It is a surgical procedure that helps the healthcare provider diagnose and treat shoulder problems. These include instability, arthritis, and rotator cuff problems. Below are instructions to help you care for your shoulder when you are at home.  What to expect  After surgery, your joint may be swollen, painful, and stiff. The joint will heal with time. But, recovery times vary depending on what was done. For example, with a shaved rotator cuff, you may be told to move your arm soon after surgery to prevent stiffness. But if the rotator cuff is repaired or treatment is for instability or arthritis, your healthcare provider may want you to limit movement of your arm for a period of time. Follow your healthcare providers instructions regarding arm movement.  Activity     You may be told to do daily pendulum swings to improve your joints flexibility. Use your torso to move your arm in a [place], first in one direction, then the other.   · Dont drive until your healthcare provider says its OK. And never drive while taking opioid pain medicine.  · Ask your healthcare provider when it is safe to do Pendulum exercises:    ¨ Hold on to the back of a chair, or lean on a tabletop with your healthy arm.  ¨ Do not actively move your arm with your shoulder muscles during this process. Instead, allow your arm to sway gently.    ¨ Use your torso to  move your affected arm in a [place]. First do 20 circles in one direction. Then do 20 circles in the other direction.  ¨ Repeat the pendulum exercise every 2 hour(s). When you feel ready, increase the number of circles to 50 in each direction, every 2 hour(s).  · Bend your wrist and wiggle your fingers often to help blood flow.  Incision care  · Check your incision daily for redness, tenderness, or drainage.  · Wait 3 day(s) after your surgery to begin showering. Then shower as needed. Carefully wash your incision with soap and water. Gently pat it dry. Dont rub the incision, or apply creams or lotions to it.  · Dont soak in a bathtub, hot tub, or pool until your healthcare provider says its OK.  Other home care  · Take your temperature daily for 7 days after your surgery. Report a fever above 100.4º F (38º C) to your healthcare provider. Fever may be a sign of infection.  · Wear your sling or immobilizer as directed by your healthcare provider.  · Use pain medicine, as needed and as directed. Don't wait until the pain is severe to start using the medicine.   · Use an ice pack or a bag of frozen peas--or something similar--wrapped in a thin towel on your shoulder to reduce swelling for the first 48 hours after surgery. Hold the ice pack. Leave the ice pack on for 20 minutes; then take it off for 20 minutes. Repeat as needed.  Follow-up  Make a follow-up appointment as directed by your healthcare provider.  When to seek medical attention  Call 911 right away if you have any of the following:  · Chest pain  · Shortness of breath  Otherwise, call your healthcare provider immediately if you have any of the following:  · Increasing shoulder pain or pain not relieved by medicine  · Pain or swelling in the arm on the side of your surgery  · Numbness, tingling, or blue-gray color of your arm or fingers on the side of your surgery  · Drainage or oozing, redness, or warmth at the incision  · Fever above 100.4º F (38º C) or  shaking chills  · Nausea or vomiting   Date Last Reviewed: 2015  © 7027-3694 [employer]. [address] [place] [address], [place] [address]. All rights reserved. This information is not intended as a substitute for professional medical care. Always follow your healthcare professional's instructions.    General Information:    1. Do not drink alcoholic beverages including beer for 24 hours or as long as you are on pain medication..  2. Do not drive a motor vehicle, operate machinery or power tools, or signs legal papers for 24 hours or as long as you are on pain medication.   3. You may experience light-headedness, dizziness, and sleepiness following surgery. Please do not stay alone. A responsible adult should be with you for this 24 hour period.  4. Go home and rest.  5. Progress slowly to a normal diet unless instructed.  Otherwise, begin with liquids such as soft drinks, then soup and crackers working up to solid foods. Drink plenty of nonalcoholic fluids.  6. Certain anesthetics and pain medications produce nausea and vomiting in certain individuals. If nausea becomes a problem at home, call you doctor.  7. A nurse will be calling you sometime after surgery. Do not be alarmed. This is our way of finding out how you are doing.  8. Several times every hour while you are awake, take 2-3 deep breaths and cough. If you had stomach surgery hold a pillow or rolled towel firmly against your stomach before you cough. This will help with any pain the cough might cause.  9. Several times every hour while you are awake, pump and flex your feet 5-6 times and do foot circles. This will help prevent blood clots.  10. Call your doctor for severe pain, bleeding, fever, or signs or symptoms of infection (pain, swelling, redness, foul odor, drainage).  11. You can contact your doctor anytime by [phone] for the [facility] (at [facility]) or [phone] for the [facility] on [facility].    [email or web address] is another way to contact your doctor if you are an active participant online with [facility].    Acetaminophen; Oxycodone tablets  What is this medicine?  ACETAMINOPHEN; OXYCODONE (a set a DOV munir fen; ox i TOM done) is a pain reliever. It is used to treat moderate to severe pain.  How should I use this medicine?  Take this medicine by mouth with a full glass of water. Follow the directions on the prescription label. You can take it with or without food. If it upsets your stomach, take it with food. Take your medicine at regular intervals. Do not take it more often than directed.  A special MedGuide will be given to you by the pharmacist with each prescription and refill. Be sure to read this information carefully each time.  Talk to your pediatrician regarding the use of this medicine in children. Special care may be needed.  What side effects may I notice from receiving this medicine?  Side effects that you should report to your doctor or health care professional as soon as possible:  · allergic reactions like skin rash, itching or hives, swelling of the face, lips, or tongue  · breathing problems  · confusion  · redness, blistering, peeling or loosening of the skin, including inside the mouth  · signs and symptoms of liver injury like dark yellow or brown urine; general ill feeling or flu-like symptoms; light-colored stools; loss of appetite; nausea; right upper belly pain; unusually weak or tired; yellowing of the eyes or skin  · signs and symptoms of low blood pressure like dizziness; feeling faint or lightheaded, falls; unusually weak or tired  · trouble passing urine or change in the amount of urine  Side effects that usually do not require medical attention (report to your doctor or health care professional if they continue or are bothersome):  · constipation  · dry mouth  · nausea, vomiting  · tiredness  What may interact with this medicine?  This medicine may interact with the following  medications:  · alcohol  · antihistamines for allergy, cough and cold  · antiviral medicines for HIV or AIDS  · atropine  · certain antibiotics like clarithromycin, erythromycin, linezolid, rifampin  · certain medicines for anxiety or sleep  · certain medicines for bladder problems like oxybutynin, tolterodine  · certain medicines for depression like amitriptyline, fluoxetine, sertraline  · certain medicines for fungal infections like ketoconazole, itraconazole, voriconazole  · certain medicines for migraine headache like almotriptan, eletriptan, frovatriptan, naratriptan, rizatriptan, sumatriptan, zolmitriptan  · certain medicines for nausea or vomiting like dolasetron, ondansetron, palonosetron  · certain medicines for Parkinson's disease like benztropine, trihexyphenidyl  · certain medicines for seizures like phenobarbital, phenytoin, primidone  · certain medicines for stomach problems like dicyclomine, hyoscyamine  · certain medicines for travel sickness like scopolamine  · diuretics  · general anesthetics like halothane, isoflurane, methoxyflurane, propofol  · ipratropium  · local anesthetics like lidocaine, pramoxine, tetracaine  · MAOIs like Carbex, Eldepryl, Marplan, Nardil, and Parnate  · medicines that relax muscles for surgery  · methylene blue  · nilotinib  · other medicines with acetaminophen  · other narcotic medicines for pain or cough  · phenothiazines like chlorpromazine, mesoridazine, prochlorperazine, thioridazine  What if I miss a dose?  If you miss a dose, take it as soon as you can. If it is almost time for your next dose, take only that dose. Do not take double or extra doses.  Where should I keep my medicine?  Keep out of the reach of children. This medicine can be abused. Keep your medicine in a safe place to protect it from theft. Do not share this medicine with anyone. Selling or giving away this medicine is dangerous and against the law.  This medicine may cause accidental overdose and  death if it taken by other adults, children, or pets. Mix any unused medicine with a substance like cat litter or coffee grounds. Then throw the medicine away in a sealed container like a sealed bag or a coffee can with a lid. Do not use the medicine after the expiration date.  Store at room temperature between 20 and 25 degrees C (68 and 77 degrees F).  What should I tell my health care provider before I take this medicine?  They need to know if you have any of these conditions:  · brain tumor  · Crohn's disease, inflammatory bowel disease, or ulcerative colitis  · drug abuse or addiction  · head injury  · heart or circulation problems  · if you often drink alcohol  · kidney disease or problems going to the bathroom  · liver disease  · lung disease, asthma, or breathing problems  · an unusual or allergic reaction to acetaminophen, oxycodone, other opioid analgesics, other medicines, foods, dyes, or preservatives  · pregnant or trying to get pregnant  · breast-feeding  What should I watch for while using this medicine?  Tell your doctor or health care professional if your pain does not go away, if it gets worse, or if you have new or a different type of pain. You may develop tolerance to the medicine. Tolerance means that you will need a higher dose of the medication for pain relief. Tolerance is normal and is expected if you take this medicine for a long time.  Do not suddenly stop taking your medicine because you may develop a severe reaction. Your body becomes used to the medicine. This does NOT mean you are addicted. Addiction is a behavior related to getting and using a drug for a non-medical reason. If you have pain, you have a medical reason to take pain medicine. Your doctor will tell you how much medicine to take. If your doctor wants you to stop the medicine, the dose will be slowly lowered over time to avoid any side effects.  There are different types of narcotic medicines (opiates). If you take more  than one type at the same time or if you are taking another medicine that also causes drowsiness, you may have more side effects. Give your health care provider a list of all medicines you use. Your doctor will tell you how much medicine to take. Do not take more medicine than directed. Call emergency for help if you have problems breathing or unusual sleepiness.  Do not take other medicines that contain acetaminophen with this medicine. Always read labels carefully. If you have questions, ask your doctor or pharmacist.  If you take too much acetaminophen get medical help right away. Too much acetaminophen can be very dangerous and cause liver damage. Even if you do not have symptoms, it is important to get help right away.  You may get drowsy or dizzy. Do not drive, use machinery, or do anything that needs mental alertness until you know how this medicine affects you. Do not stand or sit up quickly, especially if you are an older patient. This reduces the risk of dizzy or fainting spells. Alcohol may interfere with the effect of this medicine. Avoid alcoholic drinks.  The medicine will cause constipation. Try to have a bowel movement at least every 2 to 3 days. If you do not have a bowel movement for 3 days, call your doctor or health care professional.  Your mouth may get dry. Chewing sugarless gum or sucking hard candy, and drinking plenty or water may help. Contact your doctor if the problem does not go away or is severe.  Date Last Reviewed:   NOTE:This sheet is a summary. It may not cover all possible information. If you have questions about this medicine, talk to your doctor, pharmacist, or health care provider. Copyright© 2016 [employer]            Primary Diagnosis     Your primary diagnosis was:  Right Shoulder Pain      Admission Information     Date & Time Provider Department CSN    5/12/2017  5:30 AM Jacinto Graves Sr., MD [facility] [id number]      Care Providers     Provider Role  "Specialty Primary office phone    Jacinto Graves Sr., MD Attending Provider Orthopedic Surgery [phone]    Jacinto Graves Sr., MD Surgeon  Orthopedic Surgery [phone]      Your Vitals Were     BP Pulse Temp Resp Height Weight    117/74 75 98.4 °F (36.9 °C) (Temporal) 12 5' 11" (1.803 m) 67.7 kg (149 lb 4 oz)    SpO2 BMI             96% 20.82 kg/m2         Recent Lab Values     No lab values to display.      Allergies as of 5/12/2017     No Known Allergies      [facility]     [facility] Nurse Care Line - 24/7 Assistance  Unless otherwise directed by your provider, please contact [facility], our nurse care line that is available for 24/7 assistance.     Registered nurses in the [facility] provide clinical advisement, health education, appointment booking, and other advisory services.  Call for this free service at [phone].        Advance Directives     An advance directive is a document which, in the event you are no longer able to make decisions for yourself, tells your healthcare team what kind of treatment you do or do not want to receive, or who you would like to make those decisions for you.  If you do not currently have an advance directive, [facility] encourages you to create one.  For more information call:  [phone]), [phone] ([phone]),  or log on to [email or web address].        Language Assistance Services     ATTENTION: Language assistance services are available, free of charge. Please call [phone].      ATENCIÓN: Si habla español, tiene a villeda disposición servicios gratuitos de asistencia lingüística. Llame al [phone].     CHÚ Ý: N?u b?n nói Ti?ng Vi?t, có các d?ch v? h? tr? ngôn ng? mi?n phí dành cho b?n. G?i s? [phone].        [facility] Sign-Up     Activating your [facility] account is as easy as 1-2-3!     1) Visit [email or web address], select Sign Up Now, enter this activation code and your date of birth, then select " Next.  [email or web address]  Expires: 6/17/2017  2:56 PM      2) Create a username and password to use when you visit [facility] in the future and select a security question in case you lose your password and select Next.    3) Enter your e-mail address and click Sign Up!    Additional Information  If you have questions, please e-mail [email or web address] or call [phone] to talk to our [facility] staff. Remember, [facility] is NOT to be used for urgent needs. For medical emergencies, dial 911.          [facility] complies with applicable Federal civil rights laws and does not discriminate on the basis of race, color, national origin, age, disability, or sex.

## 2017-05-12 NOTE — PLAN OF CARE
Patient prepared for surgery. No family at bedside. Locked belongings bag in pre-op locker #5 per patient's request.

## 2017-05-12 NOTE — ANESTHESIA PREPROCEDURE EVALUATION
05/12/2017  García Delaney is a 47 y.o., male.    Anesthesia Evaluation    I have reviewed the Patient Summary Reports.    I have reviewed the Nursing Notes.   I have reviewed the Medications.     Review of Systems  Anesthesia Hx:  Denies Family Hx of Anesthesia complications.   Denies Personal Hx of Anesthesia complications.   Cardiovascular:   Denies Hypertension.  Denies CAD.       Pulmonary:   Denies COPD.    Renal/:   Denies Chronic Renal Disease.     Hepatic/GI:   Denies GERD.    Neurological:   Denies CVA. Denies Seizures.    Endocrine:   Denies Diabetes.        Physical Exam  General:  Well nourished    Airway/Jaw/Neck:  Airway Findings: Mouth Opening: Normal General Airway Assessment: Adult  Mallampati: I       Chest/Lungs:  Chest/Lungs Findings: Clear to auscultation, Normal Respiratory Rate     Heart/Vascular:  Heart Findings: Rate: Normal  Rhythm: Regular Rhythm             Anesthesia Plan  Type of Anesthesia, risks & benefits discussed:  Anesthesia Type:  general  Patient's Preference:   Intra-op Monitoring Plan:   Intra-op Monitoring Plan Comments:   Post Op Pain Control Plan: single-shot nerve block  Post Op Pain Control Plan Comments:   Induction:   IV  Beta Blocker:  Patient is not currently on a Beta-Blocker (No further documentation required).       Informed Consent:    ASA Score: 1     Day of Surgery Review of History & Physical: I have interviewed and examined the patient. I have reviewed the patient's H&P dated:            Ready For Surgery From Anesthesia Perspective.

## 2017-05-12 NOTE — H&P
"CC:This is a 47-year-old male that complains of right shoulder pain.     HPI:The patient states that he fell at Thanksgiving and sustained an injury to the right shoulder. The pain is rated a 3 out of 10.     PMH: History reviewed. No pertinent past medical history.     PSH: History reviewed. No pertinent past surgical history.     Family Hx: History reviewed. No pertinent family history.     Allergy:  Review of patient's allergies indicates:  No Known Allergies     Medication: No current outpatient prescriptions on file.     Social History:    Social History    Social History            Social History    Marital status: Single       Spouse name: N/A    Number of children: N/A    Years of education: N/A          Occupational History    Not on file.            Social History Main Topics    Smoking status: Current Every Day Smoker       Packs/day: 0.25    Smokeless tobacco: Former User       Types: Chew    Alcohol use Not on file    Drug use: Not on file    Sexual activity: Not on file           Other Topics Concern    Not on file          Social History Narrative    No narrative on file            Vitals:        Visit Vitals    /88    Pulse 74    Ht 5' 11" (1.803 m)    Wt 69 kg (152 lb 1.9 oz)    BMI 21.22 kg/m2         ROS:  GENERAL: No fever, chills, fatigability or weight loss.  SKIN: No rashes, itching or changes in color or texture of skin.  HEAD: No headaches or recent head trauma.  EYES: Visual acuity fine. No photophobia, ocular pain or diplopia.  EARS: Denies ear pain, discharge or vertigo.  NOSE: No loss of smell, no epistaxis or postnasal drip.  MOUTH & THROAT: No hoarseness or change in voice. No excessive gum bleeding.  NODES: Denies swollen glands.  CHEST: Denies MOLINA, cyanosis, wheezing, cough and sputum production.  CARDIOVASCULAR: Denies chest pain, PND, orthopnea or reduced exercise tolerance.  ABDOMEN: Appetite fine. No weight loss. Denies diarrhea, abdominal pain, hematemesis " or blood in stool.  URINARY: No flank pain, dysuria or hematuria.  PERIPHERAL VASCULAR: No claudication or cyanosis.  NEUROLOGIC: No history of seizures, paralysis, alteration of gait or coordination.  MUSCULOSKELETAL: See HPI     PE:  APPEARANCE: Well nourished, well developed, in no acute distress.   HEAD: Normocephalic, atraumatic.  EYES: PERRL. EOMI.   EARS: TM's intact. Light reflex normal. No retraction or perforation.   NOSE: Mucosa pink. Airway clear.  MOUTH & THROAT: No tonsillar enlargement. No pharyngeal erythema or exudate. No stridor.  NECK: Supple.   NODES: No cervical, axillary or inguinal lymph node enlargement.  CHEST: Lungs clear to auscultation.  CARDIOVASCULAR: Normal S1, S2. No rubs, murmurs or gallops.  ABDOMEN: Bowel sounds normal. Not distended. Soft. No tenderness or masses.  NEUROLOGIC: Cranial Nerves: II-XII grossly intact, also see MUSCULOSKELETAL  MUSCULOSKELETAL:      right Shoulder Exam      Muscle Appearance:Normal  Grooming:Normal  Spine Alignment-Normal  Muscle Atrophy-Yes  Deformities-Yes  Tenderness-Yes  Paresthesias-no  Range of Motion-decreased  Abd Pure-decreased  Abd Combined-decreased  Ext-decreased  Flex-decreased  Internal Rot-decreased  External Rot-decreased  Muscle Strength-decreased  Sensation-decreased  Reflexes-Normal  Crepitus-Yes  Impingement-Yes  Apprehension-No  Fatigue-Yes  Scapular Winging-No  Muscle Tightness-Yes  Instability-No  Tests on Exam-no evidence of instability  Neurovascular Status-Normal  Skin-Normal  Positive Drop Arm testYes     Assessment:  The patient has difficulty initiating abduction. Clinically the patient has a rotator cuff tear that is significant. As I recommend an arthroscope of the right shoulder.        Diagnosis:  1.right rotator cuff tear  2. Right greater tuberosity fracture     Diagnostic Studies  MRI-Yes  X-Ray-No  EMG/NCV-No  Arthrogram-No  Bone Scan-No  CT Scan-No  Doppler-No  ESR-No  CRP-No  CBC with  Diff-No  Rheumatoid/Arthritis Panel-No        Plan:      1. PT-no   2.OT-yes   3.NSAID-yes   4. Narcotics-yes   5. Wound care-N/A   6. Rest-yes   7. Surgery-yes   8. ERICH Hose-no   9. Anticoagulation therapy-no   10. Elevation-no   11. Crutches-no   12. Walker-no   13. Cane no   14. Referral-no   15.Injection-no   16. Splint / Cast / Cast Shoe-No   17. RICE-none  18. Follow up- one week

## 2017-05-12 NOTE — PROGRESS NOTES
The patient is in pre-op.  I have signed off on his right shoulder, the consent is signed.  The H and P is updated and the orders have been placed.     Jacinto Graves M.D.

## 2017-05-12 NOTE — ANESTHESIA POSTPROCEDURE EVALUATION
"Anesthesia Post Evaluation    Patient: García Delaney    Procedure(s) Performed: Procedure(s) (LRB):  ARTHROSCOPY-SHOULDER; right shoulder (Right)  ARTHROSCOPY-SHOULDER EXCISION DISTAL CLAVICLE (Right)  ARTHROSCOPY-SHOULDER WITH SUBACROMIAL DECOMPRESSION (Right)  DEBRIDEMENT-SHOULDER-ARTHROSCOPIC (Right)  ARTHROSCOPY SHOULDER WITH SLAP REPAIR (Right)  ARTHROSCOPY SHOULDER, BANKHART REPAIR (LABRAL REPAIR) (Right)    Final Anesthesia Type: general  Patient location during evaluation: PACU  Patient participation: Yes- Able to Participate  Level of consciousness: awake and alert  Post-procedure vital signs: reviewed and stable  Pain management: adequate  Airway patency: patent  PONV status at discharge: No PONV  Anesthetic complications: no      Cardiovascular status: blood pressure returned to baseline  Respiratory status: unassisted  Hydration status: euvolemic  Follow-up not needed.        Visit Vitals    /74    Pulse 75    Temp 36.9 °C (98.4 °F) (Temporal)    Resp 12    Ht 5' 11" (1.803 m)    Wt 67.7 kg (149 lb 4 oz)    SpO2 96%    BMI 20.82 kg/m2       Pain/Aleksandar Score: Pain Assessment Performed: Yes (5/12/2017 11:40 AM)  Presence of Pain: complains of pain/discomfort (5/12/2017 11:40 AM)  Pain Rating Prior to Med Admin: 4 (5/12/2017 11:16 AM)  Aleksandar Score: 10 (5/12/2017 11:40 AM)      "

## 2017-05-12 NOTE — PLAN OF CARE
Pt and pt friend lilia given discharge instructions. Both stated understanding. Pt brought to main entrance via wheelchair with RN.

## 2017-05-12 NOTE — TRANSFER OF CARE
"Anesthesia Transfer of Care Note    Patient: García Delaney    Procedure(s) Performed: Procedure(s) (LRB):  ARTHROSCOPY-SHOULDER; right shoulder (Right)  ARTHROSCOPY-SHOULDER EXCISION DISTAL CLAVICLE (Right)  ARTHROSCOPY-SHOULDER WITH SUBACROMIAL DECOMPRESSION (Right)  DEBRIDEMENT-SHOULDER-ARTHROSCOPIC (Right)  ARTHROSCOPY SHOULDER WITH SLAP REPAIR (Right)  ARTHROSCOPY SHOULDER, BANKHART REPAIR (LABRAL REPAIR) (Right)    Patient location: PACU    Anesthesia Type: general    Transport from OR: Transported from OR on room air with adequate spontaneous ventilation    Post pain: adequate analgesia    Post assessment: tolerated procedure well and no apparent anesthetic complications    Post vital signs: stable    Level of consciousness: awake, alert and oriented    Nausea/Vomiting: no nausea/vomiting    Complications: none    Transfer of care protocol was followed      Last vitals:   Visit Vitals    /82 (BP Location: Right arm, Patient Position: Sitting, BP Method: Automatic)    Pulse 67    Temp 36.7 °C (98.1 °F) (Oral)    Resp 18    Ht 5' 11" (1.803 m)    Wt 67.7 kg (149 lb 4 oz)    SpO2 97%    BMI 20.82 kg/m2     "

## 2017-05-12 NOTE — DISCHARGE INSTRUCTIONS
Discharge Instructions for Shoulder Arthroscopy  You had a shoulder arthroscopy. It is a surgical procedure that helps the healthcare provider diagnose and treat shoulder problems. These include instability, arthritis, and rotator cuff problems. Below are instructions to help you care for your shoulder when you are at home.  What to expect  After surgery, your joint may be swollen, painful, and stiff. The joint will heal with time. But, recovery times vary depending on what was done. For example, with a shaved rotator cuff, you may be told to move your arm soon after surgery to prevent stiffness. But if the rotator cuff is repaired or treatment is for instability or arthritis, your healthcare provider may want you to limit movement of your arm for a period of time. Follow your healthcare providers instructions regarding arm movement.  Activity     You may be told to do daily pendulum swings to improve your joints flexibility. Use your torso to move your arm in a [place], first in one direction, then the other.   · Dont drive until your healthcare provider says its OK. And never drive while taking opioid pain medicine.  · Ask your healthcare provider when it is safe to do Pendulum exercises:    ¨ Hold on to the back of a chair, or lean on a tabletop with your healthy arm.  ¨ Do not actively move your arm with your shoulder muscles during this process. Instead, allow your arm to sway gently.    ¨ Use your torso to move your affected arm in a [place]. First do 20 circles in one direction. Then do 20 circles in the other direction.  ¨ Repeat the pendulum exercise every 2 hour(s). When you feel ready, increase the number of circles to 50 in each direction, every 2 hour(s).  · Bend your wrist and wiggle your fingers often to help blood flow.  Incision care  · Check your incision daily for redness, tenderness, or drainage.  · Wait 3 day(s) after your surgery to begin showering. Then shower as needed. Carefully wash  your incision with soap and water. Gently pat it dry. Dont rub the incision, or apply creams or lotions to it.  · Dont soak in a bathtub, hot tub, or pool until your healthcare provider says its OK.  Other home care  · Take your temperature daily for 7 days after your surgery. Report a fever above 100.4º F (38º C) to your healthcare provider. Fever may be a sign of infection.  · Wear your sling or immobilizer as directed by your healthcare provider.  · Use pain medicine, as needed and as directed. Don't wait until the pain is severe to start using the medicine.   · Use an ice pack or a bag of frozen peas--or something similar--wrapped in a thin towel on your shoulder to reduce swelling for the first 48 hours after surgery. Hold the ice pack. Leave the ice pack on for 20 minutes; then take it off for 20 minutes. Repeat as needed.  Follow-up  Make a follow-up appointment as directed by your healthcare provider.  When to seek medical attention  Call 911 right away if you have any of the following:  · Chest pain  · Shortness of breath  Otherwise, call your healthcare provider immediately if you have any of the following:  · Increasing shoulder pain or pain not relieved by medicine  · Pain or swelling in the arm on the side of your surgery  · Numbness, tingling, or blue-gray color of your arm or fingers on the side of your surgery  · Drainage or oozing, redness, or warmth at the incision  · Fever above 100.4º F (38º C) or shaking chills  · Nausea or vomiting   Date Last Reviewed: 11/16/2015 © 2000-2016 [employer]. [address] [place] [address], [place] [address]. All rights reserved. This information is not intended as a substitute for professional medical care. Always follow your healthcare professional's instructions.    General Information:    1. Do not drink alcoholic beverages including beer for 24 hours or as long as you are on pain medication..  2. Do not drive a motor vehicle, operate machinery or  power tools, or signs legal papers for 24 hours or as long as you are on pain medication.   3. You may experience light-headedness, dizziness, and sleepiness following surgery. Please do not stay alone. A responsible adult should be with you for this 24 hour period.  4. Go home and rest.  5. Progress slowly to a normal diet unless instructed.  Otherwise, begin with liquids such as soft drinks, then soup and crackers working up to solid foods. Drink plenty of nonalcoholic fluids.  6. Certain anesthetics and pain medications produce nausea and vomiting in certain individuals. If nausea becomes a problem at home, call you doctor.  7. A nurse will be calling you sometime after surgery. Do not be alarmed. This is our way of finding out how you are doing.  8. Several times every hour while you are awake, take 2-3 deep breaths and cough. If you had stomach surgery hold a pillow or rolled towel firmly against your stomach before you cough. This will help with any pain the cough might cause.  9. Several times every hour while you are awake, pump and flex your feet 5-6 times and do foot circles. This will help prevent blood clots.  10. Call your doctor for severe pain, bleeding, fever, or signs or symptoms of infection (pain, swelling, redness, foul odor, drainage).  11. You can contact your doctor anytime by [phone] for the [facility] (at [facility]) or [phone] for the [facility] on [facility].   [email or web address] is another way to contact your doctor if you are an active participant online with [facility].    Acetaminophen; Oxycodone tablets  What is this medicine?  ACETAMINOPHEN; OXYCODONE (a set a DOV munir fen; ox i KOE done) is a pain reliever. It is used to treat moderate to severe pain.  How should I use this medicine?  Take this medicine by mouth with a full glass of water. Follow the directions on the prescription label. You can take it with or without food. If it upsets your stomach,  take it with food. Take your medicine at regular intervals. Do not take it more often than directed.  A special MedGuide will be given to you by the pharmacist with each prescription and refill. Be sure to read this information carefully each time.  Talk to your pediatrician regarding the use of this medicine in children. Special care may be needed.  What side effects may I notice from receiving this medicine?  Side effects that you should report to your doctor or health care professional as soon as possible:  · allergic reactions like skin rash, itching or hives, swelling of the face, lips, or tongue  · breathing problems  · confusion  · redness, blistering, peeling or loosening of the skin, including inside the mouth  · signs and symptoms of liver injury like dark yellow or brown urine; general ill feeling or flu-like symptoms; light-colored stools; loss of appetite; nausea; right upper belly pain; unusually weak or tired; yellowing of the eyes or skin  · signs and symptoms of low blood pressure like dizziness; feeling faint or lightheaded, falls; unusually weak or tired  · trouble passing urine or change in the amount of urine  Side effects that usually do not require medical attention (report to your doctor or health care professional if they continue or are bothersome):  · constipation  · dry mouth  · nausea, vomiting  · tiredness  What may interact with this medicine?  This medicine may interact with the following medications:  · alcohol  · antihistamines for allergy, cough and cold  · antiviral medicines for HIV or AIDS  · atropine  · certain antibiotics like clarithromycin, erythromycin, linezolid, rifampin  · certain medicines for anxiety or sleep  · certain medicines for bladder problems like oxybutynin, tolterodine  · certain medicines for depression like amitriptyline, fluoxetine, sertraline  · certain medicines for fungal infections like ketoconazole, itraconazole, voriconazole  · certain medicines for  migraine headache like almotriptan, eletriptan, frovatriptan, naratriptan, rizatriptan, sumatriptan, zolmitriptan  · certain medicines for nausea or vomiting like dolasetron, ondansetron, palonosetron  · certain medicines for Parkinson's disease like benztropine, trihexyphenidyl  · certain medicines for seizures like phenobarbital, phenytoin, primidone  · certain medicines for stomach problems like dicyclomine, hyoscyamine  · certain medicines for travel sickness like scopolamine  · diuretics  · general anesthetics like halothane, isoflurane, methoxyflurane, propofol  · ipratropium  · local anesthetics like lidocaine, pramoxine, tetracaine  · MAOIs like Carbex, Eldepryl, Marplan, Nardil, and Parnate  · medicines that relax muscles for surgery  · methylene blue  · nilotinib  · other medicines with acetaminophen  · other narcotic medicines for pain or cough  · phenothiazines like chlorpromazine, mesoridazine, prochlorperazine, thioridazine  What if I miss a dose?  If you miss a dose, take it as soon as you can. If it is almost time for your next dose, take only that dose. Do not take double or extra doses.  Where should I keep my medicine?  Keep out of the reach of children. This medicine can be abused. Keep your medicine in a safe place to protect it from theft. Do not share this medicine with anyone. Selling or giving away this medicine is dangerous and against the law.  This medicine may cause accidental overdose and death if it taken by other adults, children, or pets. Mix any unused medicine with a substance like cat litter or coffee grounds. Then throw the medicine away in a sealed container like a sealed bag or a coffee can with a lid. Do not use the medicine after the expiration date.  Store at room temperature between 20 and 25 degrees C (68 and 77 degrees F).  What should I tell my health care provider before I take this medicine?  They need to know if you have any of these conditions:  · brain  tumor  · Crohn's disease, inflammatory bowel disease, or ulcerative colitis  · drug abuse or addiction  · head injury  · heart or circulation problems  · if you often drink alcohol  · kidney disease or problems going to the bathroom  · liver disease  · lung disease, asthma, or breathing problems  · an unusual or allergic reaction to acetaminophen, oxycodone, other opioid analgesics, other medicines, foods, dyes, or preservatives  · pregnant or trying to get pregnant  · breast-feeding  What should I watch for while using this medicine?  Tell your doctor or health care professional if your pain does not go away, if it gets worse, or if you have new or a different type of pain. You may develop tolerance to the medicine. Tolerance means that you will need a higher dose of the medication for pain relief. Tolerance is normal and is expected if you take this medicine for a long time.  Do not suddenly stop taking your medicine because you may develop a severe reaction. Your body becomes used to the medicine. This does NOT mean you are addicted. Addiction is a behavior related to getting and using a drug for a non-medical reason. If you have pain, you have a medical reason to take pain medicine. Your doctor will tell you how much medicine to take. If your doctor wants you to stop the medicine, the dose will be slowly lowered over time to avoid any side effects.  There are different types of narcotic medicines (opiates). If you take more than one type at the same time or if you are taking another medicine that also causes drowsiness, you may have more side effects. Give your health care provider a list of all medicines you use. Your doctor will tell you how much medicine to take. Do not take more medicine than directed. Call emergency for help if you have problems breathing or unusual sleepiness.  Do not take other medicines that contain acetaminophen with this medicine. Always read labels carefully. If you have questions,  ask your doctor or pharmacist.  If you take too much acetaminophen get medical help right away. Too much acetaminophen can be very dangerous and cause liver damage. Even if you do not have symptoms, it is important to get help right away.  You may get drowsy or dizzy. Do not drive, use machinery, or do anything that needs mental alertness until you know how this medicine affects you. Do not stand or sit up quickly, especially if you are an older patient. This reduces the risk of dizzy or fainting spells. Alcohol may interfere with the effect of this medicine. Avoid alcoholic drinks.  The medicine will cause constipation. Try to have a bowel movement at least every 2 to 3 days. If you do not have a bowel movement for 3 days, call your doctor or health care professional.  Your mouth may get dry. Chewing sugarless gum or sucking hard candy, and drinking plenty or water may help. Contact your doctor if the problem does not go away or is severe.  Date Last Reviewed:   NOTE:This sheet is a summary. It may not cover all possible information. If you have questions about this medicine, talk to your doctor, pharmacist, or health care provider. Copyright© 2016 [employer]

## 2017-05-12 NOTE — OP NOTE
OPERATIVE DICTATION:    DATE OF SERVICE: 05/12/2017      Preoperative Diagnosis:  Right shoulder impingement    Postoperative Diagnosis:   Right shoulder Bankart lesion, SLAP lesion, and reverse Bankart Lesion, synovitis    Procedure: right shoulder arthroscope with a repair of the Bankart, reverse Bankart and SLAP lesion.  Subacromial decompression and acromioplasty    Indication for surgery: right shoulder pain    Anesthesia:  General     Complications:  none    Surgeon: Jacinto Graves M.D.     Specimen:none    Assistant:JUANITA David.  His assistance was critical and necessary with positioning of the extremity throughout the surgical procedure.   The physician assistant allowed me to access areas of the shoulder that could not be possible without the assistance of a trained orthopedic physician assistant.  His assistance was critical to allowing me to provide the highest level of care to the patient.      Operative procedure:     The patient was brought to operating room after appropriate consent and placed under general anesthesia with intubation.  The patient was placed in a beachchair position.  The right shoulder was prepped with alcohol chlorhexidine and sterilely draped.  The timeout was performed and the correct extremity identified.  The patient did receive IV.  Antibiotics prior to the incision.  Subacromial space insufflated with 1 250,000 dilution of episodes solution.  The anterior posterior and lateral portals made.  The cannula inserted the camera inserted.  The patient noted to have a subacromial, subdeltoid, subcoracoid bursitis.  Shaver inserted and a thorough bursectomy performed.  The patient noted to have an osteophyte over the anterior inferior lateral aspect of the acromion.  The bur inserted and the acromioplasty performed.  The patient did not have any evidence of a osteophyte over the distal clavicle.  There was nothing to suggest the presence of clinically significant arthritis in  the before meals joint.  The coracoacromial ligament was debrided partially.  There is no evidence of a rotator cuff tear apparent in the subacromial space.  The posterior portal used to enter the glenohumeral joint.  Patient noted to have a fulminant intra-articular synovitis.  The shaver inserted and the partial synovectomy performed.  The patient noted to have a Bankart lesion as well as a type II SLAP lesion and a reverse pain, lesion.  Bleeding bone was exposed over the peripheral rim of the glenoid from the 11:00 position to the 4:00 position.  An anchor was placed at the 3:00 position and the Bankart lesion was repaired.  A second anchor was placed at the 12:30 position and the SLAP lesion was repaired and the Bankart lesion was further reinforced.  A third anchor was placed in the 10:00 position and the reverse Bankart lesion repaired.  The shaver was used to remove all debris.  There is no evidence of a rotator cuff tear visible in the intra-articular space.  Fluid exsanguinated from the subacromial space.  The anterior posterior and lateral portals closed using interrupted 3-0 nylon sutures.  The subacromial space injected with 1% Xylocaine plain and 0.25% Marcaine plain.  Sterile gauze and sponge tape applied.  The patient tolerated the procedure well.  The patient was placed in a shoulder immobilizer.  The patient left the operating room in good condition.

## 2017-05-16 NOTE — DISCHARGE SUMMARY
[facility] -   Brief Operative Note     SUMMARY     Surgery Date: 5/12/2017     Surgeon(s) and Role:     * Jacinto Graves Sr., MD - Primary    Assisting Surgeon: None    Pre-op Diagnosis:  Tear of right rotator cuff, unspecified tear extent [M75.101]    Post-op Diagnosis:  Post-Op Diagnosis Codes:     * Tear of right rotator cuff, unspecified tear extent [M75.101]  Right shoulder bankart lesion  Right shoulder impingement  Right shoulder SLAP lesion        Procedure(s) (LRB):  ARTHROSCOPY-SHOULDER (Right)  ARTHROSCOPY-SHOULDER WITH SUBACROMIAL DECOMPRESSION (Right)  ARTHROSCOPY SHOULDER WITH SLAP REPAIR (Right)  ARTHROSCOPY SHOULDER, BANKHART REPAIR (LABRAL REPAIR) (Right)  ACROMIOPLASTY    Anesthesia: General    Description of the findings of the procedure:  Right shoulder bankart lesion  Right shoulder impingement  Right shoulder SLAP lesion      Findings/Key Components:  Right shoulder bankart lesion  Right shoulder impingement  Right shoulder SLAP lesion      Estimated Blood Loss:25 cc         Specimens:   Specimen     None          Discharge Note    SUMMARY     Admit Date: 5/12/2017    Discharge Date and Time:  5/12/17    Hospital Course (synopsis of major diagnoses, care, treatment, and services provided during the course of the hospital stay):       Final Diagnosis: Post-Op Diagnosis Codes:     Right shoulder bankart lesion  Right shoulder impingement  Right shoulder SLAP lesion    Disposition: Home or Self Care    Follow Up/Patient Instructions: follow up in 15 days, resume regular diet.    Medications:Percocet  Reconciled Home Medications:   Discharge Medication List as of 5/12/2017 11:56 AM      START taking these medications    Details   oxycodone-acetaminophen (PERCOCET)  mg per tablet Take 1 tablet by mouth every 4 (four) hours as needed., Starting 5/12/2017, Until Discontinued, Print             Discharge Procedure Orders  Referral to Occupational therapy   Referral Priority:  Routine Referral Type: Occupational Therapy   Referral Reason: Specialty Services Required    Requested Specialty: Occupational Therapy    Number of Visits Requested: 1      Diet general     Call MD for:  temperature >100.4     Call MD for:  persistent nausea and vomiting     Call MD for:  severe uncontrolled pain     Call MD for:  difficulty breathing, headache or visual disturbances     Call MD for:  redness, tenderness, or signs of infection (pain, swelling, redness, odor or green/yellow discharge around incision site)     Call MD for:  hives     Call MD for:  persistent dizziness or light-headedness     Call MD for:  extreme fatigue     Other restrictions (specify):   Scheduling Instructions: Keep the shoulder immobilizer in place     Remove dressing in 48 hours   Scheduling Instructions: Apply betadine, gauze and sponge tape, daily, after 48 hours       Follow-up Information     Follow up with Jacinto Graves Sr, MD.    Specialty:  Orthopedic Surgery    Why:  As needed, If symptoms worsen, For suture removal, For wound re-check    Contact information:    [address] [address]  [phone]

## 2017-05-25 ENCOUNTER — OFFICE VISIT (OUTPATIENT)
Dept: ORTHOPEDICS | Facility: CLINIC | Age: 47
End: 2017-05-25
Payer: COMMERCIAL

## 2017-05-25 VITALS
HEART RATE: 94 BPM | BODY MASS INDEX: 20.74 KG/M2 | SYSTOLIC BLOOD PRESSURE: 113 MMHG | RESPIRATION RATE: 12 BRPM | DIASTOLIC BLOOD PRESSURE: 81 MMHG | WEIGHT: 148.13 LBS | HEIGHT: 71 IN

## 2017-05-25 DIAGNOSIS — Z98.890 POST-OPERATIVE STATE: Primary | ICD-10-CM

## 2017-05-25 PROCEDURE — 99024 POSTOP FOLLOW-UP VISIT: CPT | Mod: ,,, | Performed by: PHYSICIAN ASSISTANT

## 2017-05-25 PROCEDURE — 99999 PR PBB SHADOW E&M-EST. PATIENT-LVL III: CPT | Mod: PBBFAC,,, | Performed by: PHYSICIAN ASSISTANT

## 2017-05-25 PROCEDURE — 99213 OFFICE O/P EST LOW 20 MIN: CPT | Mod: PBBFAC,PO | Performed by: PHYSICIAN ASSISTANT

## 2017-05-25 NOTE — PATIENT INSTRUCTIONS
Rotator Cuff Tear  The rotator cuff is a group of muscles and tendons that surround the shoulder joint. These muscles and tendons hold the arm in its joint. They also help the shoulder to rotate. The rotator cuff can be torn from overuse or injury. Gradual wear and tear can lead to inflammation of these tendons. This can progress to gradual or sudden tears.  Symptoms of a torn rotator cuff include:  · Shoulder pain that gets worse when you raise your arm overhead  · Weakness of the shoulder muscles with overhead activity  · Popping and clicking when you move your shoulder  · Shoulder pain that wakes you up at night when sleeping on the hurt shoulder  Diagnosis is made by an MRI or arthroscopy. This is a surgical procedure to look inside the joint through a small tube. Partial rotator cuff tears can be treated by first resting, then strengthening the rotator cuff muscles.  Anti-inflammatory medicines, such as ibuprofen or naproxen, are useful. A limited number of steroid injections can be given. Surgery may be recommended for complete tears and partial tears that do not respond to medical treatment.  Home care  · Avoid activities that make your pain worse. This includes overhead activities, doing the same motion over and over, and heavy lifting.  · You may use over-the-counter pain medicines to control pain, unless another medicine was prescribed. If you have chronic liver or kidney disease or ever had a stomach ulcer or GI bleeding, talk with your healthcare provider before using these medicines.  · If you were given a sling, use it for comfort. After your pain decreases, dont keep your arm in the sling all the time. Take your arm out several times a day and move the shoulder joint, as you are able.  · Your healthcare provider may recommend gentle pendulum exercises. Stand or sit with your arm vertical and close to your side. Relax your shoulder muscles and gently swing the arm forward and back, side to side, and  in small circles for about 5 minutes. Do this once or twice a day. There should be only slight pain with this exercise.  · You may benefit from physical therapy or a home exercise program to strengthen your shoulder muscles. This will also increase your pain-free range of motion. Applying heat prior to exercises can help prepare the muscles and joint for activity. Talk to your healthcare provider about what is best for your condition.  Follow-up care  Follow up with your healthcare provider, or as advised.  When to seek medical advice  Call your healthcare provider right away if any of the following occur:  · Increasing shoulder pain  · Rapid swelling in the involved shoulder or arm  · Numbness, tingling, or pain radiating down the arm to the hand  · Loss of strength in the affected arm  Date Last Reviewed: 11/23/2015  © 3761-5416 [employer]. [address] [place] [address], [place] [address]. All rights reserved. This information is not intended as a substitute for professional medical care. Always follow your healthcare professional's instructions.

## 2017-05-25 NOTE — LETTER
May 25, 2017      [facility]  [address] [place] [address]           [facility] - Orthopedics  [address]  [place] [address]  Phone: [phone]  Fax: [phone]          Patient: García Delaney   MR Number: [id number]   YOB: 1970   Date of Visit: 5/25/2017       Dear [facility]:    Thank you for referring García Delaney to me for evaluation. Attached you will find relevant portions of my assessment and plan of care.    If you have questions, please do not hesitate to call me. I look forward to following García Delaney along with you.    Sincerely,    Jos Caro PA-C    Enclosure  CC:  No Recipients    If you would like to receive this communication electronically, please contact [email or web address] or [phone] to request more information on [employer] Link access.    For providers and/or their staff who would like to refer a patient to [facility], please contact us through our one-stop-shop provider referral line, Stuart Arriola, at [phone].    If you feel you have received this communication in error or would no longer like to receive these types of communications, please e-mail [email or web address]

## 2017-05-25 NOTE — PROGRESS NOTES
"CC:This is a 47-year-old male that complains of right shoulder pain.    HPI:The patient states that he fell at Thanksgiving and sustained an injury to the right shoulder. The pain is rated a 0/10 since the surgery.    Date of surgery : 05/12/2017-right shoulder arthroscope with a repair of the Bankart, reverse Bankart and SLAP lesion.  Subacromial decompression and acromioplasty    PMH:    Past Medical History:   Diagnosis Date    Fractures        PSH:    Past Surgical History:   Procedure Laterality Date    KNEE SURGERY Left     ORIF FOREARM FRACTURE Left     SHOULDER ARTHROSCOPY Right 05/12/2017       Family Hx:  History reviewed. No pertinent family history.    Allergy:  Review of patient's allergies indicates:  No Known Allergies    Medication:    Current Outpatient Prescriptions:     oxycodone-acetaminophen (PERCOCET)  mg per tablet, Take 1 tablet by mouth every 4 (four) hours as needed., Disp: 90 tablet, Rfl: 0    Social History:    Social History     Social History    Marital status: Single     Spouse name: N/A    Number of children: N/A    Years of education: N/A     Occupational History    Not on file.     Social History Main Topics    Smoking status: Current Every Day Smoker     Packs/day: 0.25    Smokeless tobacco: Former User     Types: Chew      Comment: no tobacco products after m.n prior to sx    Alcohol use Yes      Comment: qod  No alcohol prior to sx    Drug use: No    Sexual activity: Not on file     Other Topics Concern    Not on file     Social History Narrative    No narrative on file       Vitals:     /81   Pulse 94   Resp 12   Ht 5' 11" (1.803 m)   Wt 67.2 kg (148 lb 2.4 oz)   BMI 20.66 kg/m²      ROS:  GENERAL: No fever, chills, fatigability or weight loss.  SKIN: No rashes, itching or changes in color or texture of skin.  HEAD: No headaches or recent head trauma.  EYES: Visual acuity fine. No photophobia, ocular pain or diplopia.  EARS: Denies ear pain, " discharge or vertigo.  NOSE: No loss of smell, no epistaxis or postnasal drip.  MOUTH & THROAT: No hoarseness or change in voice. No excessive gum bleeding.  NODES: Denies swollen glands.  CHEST: Denies MOLINA, cyanosis, wheezing, cough and sputum production.  CARDIOVASCULAR: Denies chest pain, PND, orthopnea or reduced exercise tolerance.  ABDOMEN: Appetite fine. No weight loss. Denies diarrhea, abdominal pain, hematemesis or blood in stool.  URINARY: No flank pain, dysuria or hematuria.  PERIPHERAL VASCULAR: No claudication or cyanosis.  NEUROLOGIC: No history of seizures, paralysis, alteration of gait or coordination.  MUSCULOSKELETAL: See HPI    PE:  APPEARANCE: Well nourished, well developed, in no acute distress.   HEAD: Normocephalic, atraumatic.  EYES: PERRL. EOMI.   EARS: TM's intact. Light reflex normal. No retraction or perforation.   NOSE: Mucosa pink. Airway clear.  MOUTH & THROAT: No tonsillar enlargement. No pharyngeal erythema or exudate. No stridor.  NECK: Supple.   NODES: No cervical, axillary or inguinal lymph node enlargement.  CHEST: Lungs clear to auscultation.  CARDIOVASCULAR: Normal S1, S2. No rubs, murmurs or gallops.  ABDOMEN: Bowel sounds normal. Not distended. Soft. No tenderness or masses.  NEUROLOGIC: Cranial Nerves: II-XII grossly intact, also see MUSCULOSKELETAL  MUSCULOSKELETAL: Right shoulder   Dressing intact, 2 plus distal pulses, light touch intact Right  upper extremity.  All digits are warm. No erythema, no warmth, no drainage, No swelling, no significant tenderness.                       Diagnosis:              1.right rotator cuff tear               2. Right greater tuberosity fracture          Plan:                                                 1. PT-Yes                                             2.OT-yes                                          3.NSAID-yes                                        4. Narcotics-yes                                     5. Wound care-N/A                                  6. Rest-yes                                           7. Surgery-yes                                         8. ERICH Hose-no                                    9. Anticoagulation therapy-no               10. Elevation-no                                     11. Crutches-no                                    12. Walker-no             13. Cane no                        14. Referral-no                                     15.Injection-no                            16. Splint   /    Cast   /   Cast Shoe-No              17. RICE-none            18. Follow up- 5 weeks, sutures out today, continue PT/OT

## 2017-06-15 DIAGNOSIS — Z98.890 POST-OPERATIVE STATE: Primary | ICD-10-CM

## 2017-06-20 DIAGNOSIS — Z98.890 POST-OPERATIVE STATE: Primary | ICD-10-CM

## 2017-06-22 ENCOUNTER — TELEPHONE (OUTPATIENT)
Dept: ORTHOPEDICS | Facility: CLINIC | Age: 47
End: 2017-06-22

## 2017-06-22 NOTE — TELEPHONE ENCOUNTER
----- Message from Jos Caro PA-C sent at 6/20/2017  6:48 AM CDT -----  New PT orders written.  Please sent/fax to PT/OT and advise patient  Thanks

## 2017-06-29 ENCOUNTER — TELEPHONE (OUTPATIENT)
Dept: ORTHOPEDICS | Facility: CLINIC | Age: 47
End: 2017-06-29

## 2017-06-30 ENCOUNTER — OFFICE VISIT (OUTPATIENT)
Dept: ORTHOPEDICS | Facility: CLINIC | Age: 47
End: 2017-06-30
Payer: COMMERCIAL

## 2017-06-30 VITALS
WEIGHT: 148.13 LBS | SYSTOLIC BLOOD PRESSURE: 119 MMHG | BODY MASS INDEX: 20.74 KG/M2 | HEIGHT: 71 IN | HEART RATE: 81 BPM | DIASTOLIC BLOOD PRESSURE: 83 MMHG

## 2017-06-30 DIAGNOSIS — Z98.890 POST-OPERATIVE STATE: Primary | ICD-10-CM

## 2017-06-30 PROCEDURE — 99999 PR PBB SHADOW E&M-EST. PATIENT-LVL III: CPT | Mod: PBBFAC,,, | Performed by: PHYSICIAN ASSISTANT

## 2017-06-30 PROCEDURE — 99213 OFFICE O/P EST LOW 20 MIN: CPT | Mod: PBBFAC,PO | Performed by: PHYSICIAN ASSISTANT

## 2017-06-30 PROCEDURE — 99024 POSTOP FOLLOW-UP VISIT: CPT | Mod: ,,, | Performed by: PHYSICIAN ASSISTANT

## 2017-06-30 NOTE — PATIENT INSTRUCTIONS
Shoulder Joint Surgery: Bankart Repair  A Bankart lesion is a shoulder injury. It happens when there is a tear in the labrum. This is the fibrous cartilage that helps hold the shoulder joint in place. Surgery can repair this injury. This surgery may be done through a few small incisions called arthroscopic surgery, or it may be done through one larger incision known as open surgery. You and your healthcare provider will discuss which method is right for you.      Preparing for surgery  What you can do to prepare for surgery:   · Tell your healthcare provider what prescription and over-the-counter medicines you take, including herbs and supplements as well as things like aspirin and ibuprofen. Ask whether you should stop taking any of these before surgery.  · Do not eat or drink anything after midnight the night before surgery.  · Bring any X-rays, forms, or scans your healthcare provider needs with you to the hospital.  · Arrange for an adult family member or friend to give you a ride home after the procedure.  During surgery  Your surgeon will examine the shoulder joint. One or more of the following repairs may be done:  · The labrum and glenohumeral ligament are reattached to the glenoid. Surgical anchors or sutures are used.  · Other damage to the shoulder may be repaired. This includes tightening the capsule (sheet of tough fibers that surrounds the glenoid and humerus).   Possible risks and complications of shoulder surgery  Here are some the risks and complications:   · Infection  · Damage to nerves or blood vessels  · Moving or breaking of surgical anchors  · Stiffness and/'or pain  · Recurring instability of the shoulder joint   Date Last Reviewed: 10/12/2015  © 9216-2248 [employer]. [address] [place] [address], [place] [address]. All rights reserved. This information is not intended as a substitute for professional medical care. Always follow your healthcare professional's instructions.

## 2017-06-30 NOTE — LETTER
July 3, 2017      [facility]  [address] [address] [place] [address]           [facility] - Orthopedics  [address]  [place] [address]  Phone: [phone]  Fax: [phone]          Patient: García Delaney   MR Number: [id number]   YOB: 1970   Date of Visit: 6/30/2017       Dear [facility]:    Thank you for referring García Delaney to me for evaluation. Attached you will find relevant portions of my assessment and plan of care.    If you have questions, please do not hesitate to call me. I look forward to following García Delaney along with you.    Sincerely,    Jos Caro PA-C    Enclosure  CC:  No Recipients    If you would like to receive this communication electronically, please contact [email or web address] or [phone] to request more information on [employer] Link access.    For providers and/or their staff who would like to refer a patient to [facility], please contact us through our one-stop-shop provider referral line, Stuart Arriola, at [phone].    If you feel you have received this communication in error or would no longer like to receive these types of communications, please e-mail [email or web address]

## 2017-07-03 NOTE — PROGRESS NOTES
"CC:This is a 47-year-old male that complains of right shoulder pain.    HPI:The patient states that he fell at Thanksgiving and sustained an injury to the right shoulder. The pain is rated a 0/10 since the surgery.    Date of surgery : 05/12/2017-right shoulder arthroscope with a repair of the Bankart, reverse Bankart and SLAP lesion.  Subacromial decompression and acromioplasty    PMH:    Past Medical History:   Diagnosis Date    Fractures        PSH:    Past Surgical History:   Procedure Laterality Date    KNEE SURGERY Left     ORIF FOREARM FRACTURE Left     SHOULDER ARTHROSCOPY Right 05/12/2017       Family Hx:  History reviewed. No pertinent family history.    Allergy:  Review of patient's allergies indicates:  No Known Allergies    Medication:    Current Outpatient Prescriptions:     oxycodone-acetaminophen (PERCOCET)  mg per tablet, Take 1 tablet by mouth every 4 (four) hours as needed., Disp: 90 tablet, Rfl: 0    Social History:    Social History     Social History    Marital status: Single     Spouse name: N/A    Number of children: N/A    Years of education: N/A     Occupational History    Not on file.     Social History Main Topics    Smoking status: Current Every Day Smoker     Packs/day: 0.25    Smokeless tobacco: Former User     Types: Chew      Comment: no tobacco products after m.n prior to sx    Alcohol use Yes      Comment: qod  No alcohol prior to sx    Drug use: No    Sexual activity: Not on file     Other Topics Concern    Not on file     Social History Narrative    No narrative on file       Vitals:     /83   Pulse 81   Ht 5' 11" (1.803 m)   Wt 67.2 kg (148 lb 2.4 oz)   BMI 20.66 kg/m²      ROS:  GENERAL: No fever, chills, fatigability or weight loss.  SKIN: No rashes, itching or changes in color or texture of skin.  HEAD: No headaches or recent head trauma.  EYES: Visual acuity fine. No photophobia, ocular pain or diplopia.  EARS: Denies ear pain, discharge or " vertigo.  NOSE: No loss of smell, no epistaxis or postnasal drip.  MOUTH & THROAT: No hoarseness or change in voice. No excessive gum bleeding.  NODES: Denies swollen glands.  CHEST: Denies MOLINA, cyanosis, wheezing, cough and sputum production.  CARDIOVASCULAR: Denies chest pain, PND, orthopnea or reduced exercise tolerance.  ABDOMEN: Appetite fine. No weight loss. Denies diarrhea, abdominal pain, hematemesis or blood in stool.  URINARY: No flank pain, dysuria or hematuria.  PERIPHERAL VASCULAR: No claudication or cyanosis.  NEUROLOGIC: No history of seizures, paralysis, alteration of gait or coordination.  MUSCULOSKELETAL: See HPI    PE:  APPEARANCE: Well nourished, well developed, in no acute distress.   HEAD: Normocephalic, atraumatic.  EYES: PERRL. EOMI.   EARS: TM's intact. Light reflex normal. No retraction or perforation.   NOSE: Mucosa pink. Airway clear.  MOUTH & THROAT: No tonsillar enlargement. No pharyngeal erythema or exudate. No stridor.  NECK: Supple.   NODES: No cervical, axillary or inguinal lymph node enlargement.  CHEST: Lungs clear to auscultation.  CARDIOVASCULAR: Normal S1, S2. No rubs, murmurs or gallops.  ABDOMEN: Bowel sounds normal. Not distended. Soft. No tenderness or masses.  NEUROLOGIC: Cranial Nerves: II-XII grossly intact, also see MUSCULOSKELETAL  MUSCULOSKELETAL: Right shoulder   Dressing intact, 2 plus distal pulses, light touch intact Right  upper extremity.  All digits are warm. No erythema, no warmth, no drainage, No swelling, no significant tenderness.                       Diagnosis:              1.right rotator cuff tear               2. Right greater tuberosity fracture          Plan:                                                 1. PT-Yes                                             2.OT-yes                                          3.NSAID-yes                                        4. Narcotics-yes                                     5. Wound care-N/A                                  6. Rest-yes                                           7. Surgery-yes                                         8. ERICH Hose-no                                    9. Anticoagulation therapy-no               10. Elevation-no                                     11. Crutches-no                                    12. Walker-no             13. Cane no                        14. Referral-no                                     15.Injection-no                            16. Splint   /    Cast   /   Cast Shoe-No              17. RICE-none            18. Follow up-3 months,  continue PT/OT

## 2017-07-26 ENCOUNTER — TELEPHONE (OUTPATIENT)
Dept: ORTHOPEDICS | Facility: CLINIC | Age: 47
End: 2017-07-26

## 2017-07-26 NOTE — TELEPHONE ENCOUNTER
----- Message from Dara Tejeda sent at 7/26/2017  9:26 AM CDT -----  Contact: Reyna/ Physical Therapy  Reyna stated that she fax over orders to continue PT and she needs to know the status, Please call her at [phone].    Thanks  td

## 2017-07-26 NOTE — TELEPHONE ENCOUNTER
Returned Reyna's phone call.Renya needed eta of signed orders. Advised Reyna they would be faxed tomorrow.

## (undated) DEVICE — DECANTER VIAL ASEPTIC TRANSFER

## (undated) DEVICE — GLOVE SURGICAL LATEX SZ 8

## (undated) DEVICE — SEE MEDLINE ITEM 152739

## (undated) DEVICE — APPLICATOR CHLORAPREP ORN 26ML

## (undated) DEVICE — SUPPORT ULNA NERVE PROTECTOR

## (undated) DEVICE — ELECTRODE VAPR S 90 4.0MM

## (undated) DEVICE — SPONGE DERMACEA GAUZE 4X4

## (undated) DEVICE — GAUZE SPONGE 4X4 12PLY

## (undated) DEVICE — SHEET XLGE DRAPE

## (undated) DEVICE — SEE MEDLINE ITEM 157166

## (undated) DEVICE — TAPE SURGICAL MICROPORE 3IN

## (undated) DEVICE — GLOVE SURG BIOGEL LATEX SZ 7.5

## (undated) DEVICE — UNDERGLOVES BIOGEL PI SIZE 8.5

## (undated) DEVICE — TUBING FLOSTEADY ARTHROSCOPY

## (undated) DEVICE — BIT DRILL 2.4MM GRYPHON

## (undated) DEVICE — NDL FILTER MICRON 18G 1 1/2

## (undated) DEVICE — TAPE SILK 3IN

## (undated) DEVICE — LINER GLOVE POWDERFREE 8

## (undated) DEVICE — SYR 3CC LUER LOC

## (undated) DEVICE — CONTAINER SPECIMEN STRL 4OZ

## (undated) DEVICE — NDL HYPODERMIC BLUNT 18G 1.5IN

## (undated) DEVICE — DRAPE PLASTIC U 60X72

## (undated) DEVICE — SCRUB 10% POVIDONE IODINE 4OZ

## (undated) DEVICE — SEE MEDLINE ITEM 152529

## (undated) DEVICE — SEE MEDLINE ITEM 157117

## (undated) DEVICE — KIT TRIMANO CHIN

## (undated) DEVICE — DEVICE CHIA PERCPASSER

## (undated) DEVICE — CANNULA ARTHSCP SMOOTH 5.5X75

## (undated) DEVICE — MANIFOLD 4 PORT

## (undated) DEVICE — SOL IRR NACL .9% 3000ML

## (undated) DEVICE — SEE MEDLINE ITEM 152622

## (undated) DEVICE — NDL SPINAL 18GX3.5 SPINOCAN

## (undated) DEVICE — SOL NACL 0.9% INJ 250ML BG

## (undated) DEVICE — SEE MEDLINE ITEM 157131

## (undated) DEVICE — GROUND ELECTRODE DISPOSABLE

## (undated) DEVICE — SEE MEDLINE ITEM 157027

## (undated) DEVICE — DRAPE STERI INSTRUMENT 1018

## (undated) DEVICE — SYR 30CC LUER LOCK

## (undated) DEVICE — ALCOHOL 70% ISOP RUBBING 4OZ

## (undated) DEVICE — ELECTRODE REM PLYHSV RETURN 9

## (undated) DEVICE — SET DECANTER MEDICHOICE

## (undated) DEVICE — SYR ONLY LUER LOCK 20CC

## (undated) DEVICE — SEE MEDLINE ITEM 157216

## (undated) DEVICE — POSITIONER HEAD DONUT 9IN FOAM

## (undated) DEVICE — TIP SUCTION YANKAUER